# Patient Record
Sex: MALE | Race: WHITE | HISPANIC OR LATINO
[De-identification: names, ages, dates, MRNs, and addresses within clinical notes are randomized per-mention and may not be internally consistent; named-entity substitution may affect disease eponyms.]

---

## 2018-04-26 PROBLEM — Z00.00 ENCOUNTER FOR PREVENTIVE HEALTH EXAMINATION: Status: ACTIVE | Noted: 2018-04-26

## 2018-05-01 ENCOUNTER — APPOINTMENT (OUTPATIENT)
Dept: SURGERY | Facility: CLINIC | Age: 38
End: 2018-05-01

## 2020-02-05 ENCOUNTER — APPOINTMENT (OUTPATIENT)
Dept: UROLOGY | Facility: CLINIC | Age: 40
End: 2020-02-05
Payer: COMMERCIAL

## 2020-02-05 VITALS
WEIGHT: 144 LBS | RESPIRATION RATE: 15 BRPM | BODY MASS INDEX: 23.14 KG/M2 | SYSTOLIC BLOOD PRESSURE: 119 MMHG | HEIGHT: 66 IN | DIASTOLIC BLOOD PRESSURE: 71 MMHG | HEART RATE: 70 BPM

## 2020-02-05 DIAGNOSIS — N52.9 MALE ERECTILE DYSFUNCTION, UNSPECIFIED: ICD-10-CM

## 2020-02-05 DIAGNOSIS — Z78.9 OTHER SPECIFIED HEALTH STATUS: ICD-10-CM

## 2020-02-05 DIAGNOSIS — Z83.3 FAMILY HISTORY OF DIABETES MELLITUS: ICD-10-CM

## 2020-02-05 DIAGNOSIS — Z87.891 PERSONAL HISTORY OF NICOTINE DEPENDENCE: ICD-10-CM

## 2020-02-05 DIAGNOSIS — Z80.51 FAMILY HISTORY OF MALIGNANT NEOPLASM OF KIDNEY: ICD-10-CM

## 2020-02-05 PROCEDURE — 99204 OFFICE O/P NEW MOD 45 MIN: CPT

## 2020-02-11 PROBLEM — Z78.9 NO PERTINENT PAST MEDICAL HISTORY: Status: RESOLVED | Noted: 2020-02-11 | Resolved: 2020-02-11

## 2020-02-11 NOTE — ASSESSMENT
[FreeTextEntry1] : check hormone levels\par trial of sildenafil\par  side effects reviewed\par More common reviewed- redness or warmth in your face, neck, or chest; cold symptoms such as stuffy nose, sneezing, or sore throat; headache; memory problems; diarrhea, upset stomach; or muscle pain, back pain.\par Stop immediately and got to ER for changes in vision or sudden vision loss; ringing in your ears, or sudden hearing loss; chest pain or heavy feeling, pain spreading to the arm or shoulder, nausea, sweating, general ill feeling; irregular heartbeat; shortness of breath, swelling in your hands or feet; seizure (convulsions); feeling light-headed, fainting; or penis erection that is painful or lasts 4 hours or longer\par

## 2020-02-11 NOTE — HISTORY OF PRESENT ILLNESS
[FreeTextEntry1] : Cc ed\par Erectile Dysfunction\par Patient complains of erectile dysfunction. Onset of dysfunction was several years ago and was gradual in onset.  Patient states the nature of difficulty is  maintaining erection. Full erections occur never. Partial erections occursLibido is not affected. problems not partner specific

## 2020-02-11 NOTE — PHYSICAL EXAM
[General Appearance - Well Nourished] : well nourished [General Appearance - Well Developed] : well developed [Well Groomed] : well groomed [Normal Appearance] : normal appearance [Edema] : no peripheral edema [General Appearance - In No Acute Distress] : no acute distress [Respiration, Rhythm And Depth] : normal respiratory rhythm and effort [Exaggerated Use Of Accessory Muscles For Inspiration] : no accessory muscle use [Abdomen Soft] : soft [Abdomen Tenderness] : non-tender [Costovertebral Angle Tenderness] : no ~M costovertebral angle tenderness [Urethral Meatus] : meatus normal [Urinary Bladder Findings] : the bladder was normal on palpation [Scrotum] : the scrotum was normal [Testes Mass (___cm)] : there were no testicular masses [Normal Station and Gait] : the gait and station were normal for the patient's age [] : no rash [No Focal Deficits] : no focal deficits [Mood] : the mood was normal [Oriented To Time, Place, And Person] : oriented to person, place, and time [Affect] : the affect was normal [Not Anxious] : not anxious [No Palpable Adenopathy] : no palpable adenopathy

## 2020-08-11 ENCOUNTER — RX RENEWAL (OUTPATIENT)
Age: 40
End: 2020-08-11

## 2020-08-11 RX ORDER — SILDENAFIL 50 MG/1
50 TABLET ORAL
Qty: 6 | Refills: 0 | Status: ACTIVE | COMMUNITY
Start: 2020-02-05 | End: 1900-01-01

## 2021-09-02 ENCOUNTER — APPOINTMENT (OUTPATIENT)
Dept: SURGERY | Facility: CLINIC | Age: 41
End: 2021-09-02
Payer: COMMERCIAL

## 2021-09-02 VITALS
WEIGHT: 144 LBS | HEART RATE: 56 BPM | HEIGHT: 66 IN | SYSTOLIC BLOOD PRESSURE: 124 MMHG | OXYGEN SATURATION: 100 % | DIASTOLIC BLOOD PRESSURE: 77 MMHG | BODY MASS INDEX: 23.14 KG/M2

## 2021-09-02 DIAGNOSIS — K40.90 UNILATERAL INGUINAL HERNIA, W/OUT OBSTRUCTION OR GANGRENE, NOT SPECIFIED AS RECURRENT: ICD-10-CM

## 2021-09-02 PROCEDURE — 99243 OFF/OP CNSLTJ NEW/EST LOW 30: CPT

## 2021-09-02 NOTE — ASSESSMENT
[FreeTextEntry1] : 40 year M for Left  inguinal hernia repair with mesh. Risks, benefits and alternatives discussed including bleeding, infection, recurrence, nerve injury, chronic pain, testicular atrophy or swelling and hematoma. All questions answered. Agrees to proceed. \par \par \par PST\par Labs\par COVID

## 2021-09-02 NOTE — CONSULT LETTER
[Dear  ___] : Dear  [unfilled], [Consult Letter:] : I had the pleasure of evaluating your patient, [unfilled]. [Consult Closing:] : Thank you very much for allowing me to participate in the care of this patient.  If you have any questions, please do not hesitate to contact me. [Sincerely,] : Sincerely, [FreeTextEntry3] : Marck Alexandre MD\par

## 2021-09-02 NOTE — PLAN
[FreeTextEntry1] : Mr. RUSS MATHEWS [facility] Patient was told significance of findings, options, risks and benefits were explained. Informed consent for open  Left  inguinal hernia repair with mesh .and potential risks, benefits and alternatives (surgical options were discussed including non-surgical options or the option of no surgery) to the planned surgery were discussed in depth. All surgical options were discussed including non-surgical treatments. The patient wishes to proceed with surgery. We will plan for surgery on at the next available date, pending any required insurance pre-certification or pre-approval. Patient agrees to obtain any necessary pre-operative evaluations and testing prior to surgery.\par Patient advised to seek immediate medical attention with any acute change in symptoms or with the development of any new or worsening symptoms. Patient's questions and concerns addressed to patient's satisfaction, and patient verbalized an understanding of the information discussed.\par \par Will schedule for the surgery at [facility].

## 2021-09-02 NOTE — DATA REVIEWED
[FreeTextEntry1] : Date of Exam: 08-\par  \par EXAM:  ULTRASOUND GROIN FOR UNILATERAL HERNIA\par \par HISTORY:  Evaluate inguinal hernia.\par \par TECHNIQUE:  High-frequency grayscale imaging was obtained through the left groin with and without Valsalva.\par \par COMPARISON:  None.\par \par FINDINGS:\par Evaluation of the groin with and without Valsalva demonstrates a nonreducible fat filled indirect inguinal hernia. In the supine position with valsalva, the hernia measures 2.4 x 0.8 x 1.3 cm, the neck measures 0.5 cm.\par In the standing position with valsalva, the hernia measures 2.2 x 0.7 x 1.3, the neck measures 0.35.\par \par IMPRESSION:\par Nonreducible fat filled indirect inguinal hernia.

## 2021-09-02 NOTE — HISTORY OF PRESENT ILLNESS
[de-identified] : RUSS NICKERSON is a 40 year old male who present in the office for initial consultation who was referred by Dr. Bryan Crain with the chief complaint of having discomfort  and burning his Left  groin.  Patient  has had the condition for 3 months and is worse when he is heavy lifting.  Patient is stating that the hernia is getting bigger and symptomatic.

## 2021-09-02 NOTE — PHYSICAL EXAM
[Alert] : alert [Oriented to Person] : oriented to person [Oriented to Place] : oriented to place [Calm] : calm [Oriented to Time] : oriented to time [de-identified] : The patient is alert, well-groomed, well developed and cheerful.  [de-identified] : Breath sounds equal and bilateral, no wheezing no rales or rhonchi  [de-identified] : WNL [de-identified] :  good S1, S2, no m/r/g bilateral  [de-identified] : reducible Left   inguinal hernia.  No evidence of hernia on the opposite side.  No penile discharge or lesions.  No scrotal swelling or discoloration. Testes descended bilaterally, smooth, without masses. Epididymis non-tender.

## 2021-09-03 LAB
ALBUMIN SERPL ELPH-MCNC: 4.5 G/DL
ALP BLD-CCNC: 62 U/L
ALT SERPL-CCNC: 34 U/L
ANION GAP SERPL CALC-SCNC: 9 MMOL/L
AST SERPL-CCNC: 25 U/L
BASOPHILS # BLD AUTO: 0.05 K/UL
BASOPHILS NFR BLD AUTO: 0.8 %
BILIRUB SERPL-MCNC: 0.4 MG/DL
BUN SERPL-MCNC: 12 MG/DL
CALCIUM SERPL-MCNC: 9.7 MG/DL
CHLORIDE SERPL-SCNC: 105 MMOL/L
CO2 SERPL-SCNC: 27 MMOL/L
CREAT SERPL-MCNC: 0.88 MG/DL
EOSINOPHIL # BLD AUTO: 0.14 K/UL
EOSINOPHIL NFR BLD AUTO: 2.4 %
GLUCOSE SERPL-MCNC: 76 MG/DL
HCT VFR BLD CALC: 44.4 %
HGB BLD-MCNC: 14.8 G/DL
IMM GRANULOCYTES NFR BLD AUTO: 0.2 %
LYMPHOCYTES # BLD AUTO: 2.29 K/UL
LYMPHOCYTES NFR BLD AUTO: 38.7 %
MAN DIFF?: NORMAL
MCHC RBC-ENTMCNC: 31.8 PG
MCHC RBC-ENTMCNC: 33.3 GM/DL
MCV RBC AUTO: 95.3 FL
MONOCYTES # BLD AUTO: 0.65 K/UL
MONOCYTES NFR BLD AUTO: 11 %
NEUTROPHILS # BLD AUTO: 2.78 K/UL
NEUTROPHILS NFR BLD AUTO: 46.9 %
PLATELET # BLD AUTO: 210 K/UL
POTASSIUM SERPL-SCNC: 4.1 MMOL/L
PROT SERPL-MCNC: 6.9 G/DL
RBC # BLD: 4.66 M/UL
RBC # FLD: 11.8 %
SODIUM SERPL-SCNC: 141 MMOL/L
WBC # FLD AUTO: 5.92 K/UL

## 2021-09-09 ENCOUNTER — OUTPATIENT (OUTPATIENT)
Dept: OUTPATIENT SERVICES | Facility: HOSPITAL | Age: 41
LOS: 1 days | End: 2021-09-09
Payer: COMMERCIAL

## 2021-09-09 VITALS
SYSTOLIC BLOOD PRESSURE: 116 MMHG | DIASTOLIC BLOOD PRESSURE: 71 MMHG | HEART RATE: 61 BPM | TEMPERATURE: 99 F | WEIGHT: 143.96 LBS | OXYGEN SATURATION: 100 % | RESPIRATION RATE: 17 BRPM | HEIGHT: 66 IN

## 2021-09-09 DIAGNOSIS — Z78.9 OTHER SPECIFIED HEALTH STATUS: Chronic | ICD-10-CM

## 2021-09-09 DIAGNOSIS — B20 HUMAN IMMUNODEFICIENCY VIRUS [HIV] DISEASE: ICD-10-CM

## 2021-09-09 DIAGNOSIS — Z01.818 ENCOUNTER FOR OTHER PREPROCEDURAL EXAMINATION: ICD-10-CM

## 2021-09-09 DIAGNOSIS — K40.90 UNILATERAL INGUINAL HERNIA, WITHOUT OBSTRUCTION OR GANGRENE, NOT SPECIFIED AS RECURRENT: ICD-10-CM

## 2021-09-09 DIAGNOSIS — Z29.9 ENCOUNTER FOR PROPHYLACTIC MEASURES, UNSPECIFIED: ICD-10-CM

## 2021-09-09 PROCEDURE — G0463: CPT

## 2021-09-09 NOTE — H&P PST ADULT - ASSESSMENT
40 yr old pleasant male with history of HIV presents with unilateral inguinal hernia without obstruction or gangrene not specified as recurrent. Pt schedule f open left inguinal hernia repair with mesh on 9/15/2021.

## 2021-09-09 NOTE — H&P PST ADULT - NSICDXPASTMEDICALHX_GEN_ALL_CORE_FT
PAST MEDICAL HISTORY:  HIV disease     Unilateral inguinal hernia, without obstruction or gangrene, not specified as recurrent

## 2021-09-09 NOTE — H&P PST ADULT - HISTORY OF PRESENT ILLNESS
40 yr old healthy male with history of HIV presents with unilateral inguinal hernia without obstruction or gangrene not specified as recurrent. Pt stated in July 2021 felt a lump in his groin after carrying luggage on a vacation. Pt had a ultrasound in August which confirmed a hernia. Pt is schedule for open left inguinal hernia with mesh on 9/15/2021. Pt instructed to use chlorhexidine wash the morning of surgery. Pt verbalized understanding and provided paper instruction.

## 2021-09-12 ENCOUNTER — APPOINTMENT (OUTPATIENT)
Dept: DISASTER EMERGENCY | Facility: CLINIC | Age: 41
End: 2021-09-12

## 2021-09-12 DIAGNOSIS — Z01.818 ENCOUNTER FOR OTHER PREPROCEDURAL EXAMINATION: ICD-10-CM

## 2021-09-13 LAB — SARS-COV-2 N GENE NPH QL NAA+PROBE: NOT DETECTED

## 2021-09-14 ENCOUNTER — TRANSCRIPTION ENCOUNTER (OUTPATIENT)
Age: 41
End: 2021-09-14

## 2021-09-15 ENCOUNTER — OUTPATIENT (OUTPATIENT)
Dept: OUTPATIENT SERVICES | Facility: HOSPITAL | Age: 41
LOS: 1 days | End: 2021-09-15
Payer: COMMERCIAL

## 2021-09-15 ENCOUNTER — APPOINTMENT (OUTPATIENT)
Dept: SURGERY | Facility: HOSPITAL | Age: 41
End: 2021-09-15

## 2021-09-15 VITALS
WEIGHT: 143.96 LBS | TEMPERATURE: 98 F | HEIGHT: 66 IN | HEART RATE: 63 BPM | SYSTOLIC BLOOD PRESSURE: 113 MMHG | RESPIRATION RATE: 16 BRPM | DIASTOLIC BLOOD PRESSURE: 71 MMHG | OXYGEN SATURATION: 99 %

## 2021-09-15 VITALS
HEART RATE: 60 BPM | TEMPERATURE: 98 F | DIASTOLIC BLOOD PRESSURE: 73 MMHG | SYSTOLIC BLOOD PRESSURE: 110 MMHG | OXYGEN SATURATION: 100 % | RESPIRATION RATE: 12 BRPM

## 2021-09-15 DIAGNOSIS — Z01.818 ENCOUNTER FOR OTHER PREPROCEDURAL EXAMINATION: ICD-10-CM

## 2021-09-15 DIAGNOSIS — Z78.9 OTHER SPECIFIED HEALTH STATUS: Chronic | ICD-10-CM

## 2021-09-15 DIAGNOSIS — K40.90 UNILATERAL INGUINAL HERNIA, WITHOUT OBSTRUCTION OR GANGRENE, NOT SPECIFIED AS RECURRENT: ICD-10-CM

## 2021-09-15 PROCEDURE — C1781: CPT

## 2021-09-15 PROCEDURE — 49505 PRP I/HERN INIT REDUC >5 YR: CPT

## 2021-09-15 PROCEDURE — 49505 PRP I/HERN INIT REDUC >5 YR: CPT | Mod: LT

## 2021-09-15 PROCEDURE — 49505 PRP I/HERN INIT REDUC >5 YR: CPT | Mod: AS

## 2021-09-15 RX ORDER — ONDANSETRON 8 MG/1
4 TABLET, FILM COATED ORAL ONCE
Refills: 0 | Status: DISCONTINUED | OUTPATIENT
Start: 2021-09-15 | End: 2021-09-15

## 2021-09-15 RX ORDER — EMTRICITABINE AND TENOFOVIR DISOPROXIL FUMARATE 200; 300 MG/1; MG/1
1 TABLET, FILM COATED ORAL
Qty: 0 | Refills: 0 | DISCHARGE

## 2021-09-15 RX ORDER — ACETAMINOPHEN 500 MG
1000 TABLET ORAL ONCE
Refills: 0 | Status: DISCONTINUED | OUTPATIENT
Start: 2021-09-15 | End: 2021-09-15

## 2021-09-15 RX ORDER — FENTANYL CITRATE 50 UG/ML
25 INJECTION INTRAVENOUS
Refills: 0 | Status: DISCONTINUED | OUTPATIENT
Start: 2021-09-15 | End: 2021-09-15

## 2021-09-15 RX ORDER — FENTANYL CITRATE 50 UG/ML
50 INJECTION INTRAVENOUS
Refills: 0 | Status: DISCONTINUED | OUTPATIENT
Start: 2021-09-15 | End: 2021-09-15

## 2021-09-15 RX ORDER — SODIUM CHLORIDE 9 MG/ML
3 INJECTION INTRAMUSCULAR; INTRAVENOUS; SUBCUTANEOUS EVERY 8 HOURS
Refills: 0 | Status: DISCONTINUED | OUTPATIENT
Start: 2021-09-15 | End: 2021-09-15

## 2021-09-15 RX ORDER — OXYCODONE AND ACETAMINOPHEN 5; 325 MG/1; MG/1
1 TABLET ORAL ONCE
Refills: 0 | Status: DISCONTINUED | OUTPATIENT
Start: 2021-09-15 | End: 2021-09-22

## 2021-09-15 NOTE — ASU DISCHARGE PLAN (ADULT/PEDIATRIC) - CARE PROVIDER_API CALL
Marck Alexandre (MD)  Surgery  [address] [place], [place] [id number]  Phone: [phone]  Fax: [phone]  Follow Up Time:

## 2021-09-15 NOTE — BRIEF OPERATIVE NOTE - NSICDXBRIEFPROCEDURE_GEN_ALL_CORE_FT
PROCEDURES:  Repair, hernia, inguinal, open, using mesh, adult 15-Sep-2021 08:58:01 Left Debbie Redmond

## 2021-09-16 PROBLEM — K40.90 UNILATERAL INGUINAL HERNIA, WITHOUT OBSTRUCTION OR GANGRENE, NOT SPECIFIED AS RECURRENT: Chronic | Status: ACTIVE | Noted: 2021-09-09

## 2021-09-16 PROBLEM — B20 HUMAN IMMUNODEFICIENCY VIRUS [HIV] DISEASE: Chronic | Status: ACTIVE | Noted: 2021-09-09

## 2021-09-23 ENCOUNTER — APPOINTMENT (OUTPATIENT)
Dept: SURGERY | Facility: CLINIC | Age: 41
End: 2021-09-23
Payer: COMMERCIAL

## 2021-09-23 VITALS — TEMPERATURE: 97.2 F

## 2021-09-23 PROCEDURE — 99024 POSTOP FOLLOW-UP VISIT: CPT

## 2021-09-23 NOTE — REASON FOR VISIT
[Post Op: _________] : a [unfilled] post op visit [FreeTextEntry1] : Repair of left inguinal hernia with mesh on 09/15/2021

## 2021-09-23 NOTE — HISTORY OF PRESENT ILLNESS
[de-identified] : RUSS HORTA is a 40 year old male who presents in the office for postop visit. He underwent a Repair of left inguinal hernia with mesh on 09/15/2021

## 2021-09-23 NOTE — ASSESSMENT
[FreeTextEntry1] : RUSS HORTA is a 40 year old male who underwent a  Repair of left inguinal hernia with mesh on 09/15/2021

## 2021-09-23 NOTE — CONSULT LETTER
[Dear  ___] : Dear  [unfilled], [Courtesy Letter:] : I had the pleasure of seeing your patient, [unfilled], in my office today. [Consult Closing:] : Thank you very much for allowing me to participate in the care of this patient.  If you have any questions, please do not hesitate to contact me. [Sincerely,] : Sincerely, [FreeTextEntry3] : Dr Alexandre

## 2022-01-06 NOTE — ASU PATIENT PROFILE, ADULT - DOMESTIC TRAVEL HIGH RISK QUESTION
Long discussion regarding all options and risks; all lab values and imaging studies reviewed; discussed with medicine No

## 2024-12-05 ENCOUNTER — APPOINTMENT (OUTPATIENT)
Dept: SURGERY | Facility: CLINIC | Age: 44
End: 2024-12-05
Payer: COMMERCIAL

## 2024-12-05 VITALS
HEART RATE: 60 BPM | DIASTOLIC BLOOD PRESSURE: 85 MMHG | BODY MASS INDEX: 24.75 KG/M2 | HEIGHT: 66 IN | SYSTOLIC BLOOD PRESSURE: 127 MMHG | WEIGHT: 154 LBS

## 2024-12-05 DIAGNOSIS — K60.30 ANAL FISTULA, UNSPECIFIED: ICD-10-CM

## 2024-12-05 PROCEDURE — 99203 OFFICE O/P NEW LOW 30 MIN: CPT

## 2024-12-05 RX ORDER — EMTRICITABINE AND TENOFOVIR ALAFENAMIDE 120; 15 MG/1; MG/1
TABLET ORAL
Refills: 0 | Status: ACTIVE | COMMUNITY

## 2024-12-25 PROBLEM — F10.90 ALCOHOL USE: Status: INACTIVE | Noted: 2020-02-05

## 2025-02-28 ENCOUNTER — INPATIENT (INPATIENT)
Facility: HOSPITAL | Age: 45
LOS: 6 days | Discharge: HOME | DRG: 864 | End: 2025-03-07
Attending: HOSPITALIST | Admitting: HOSPITALIST
Payer: COMMERCIAL

## 2025-02-28 VITALS
DIASTOLIC BLOOD PRESSURE: 77 MMHG | TEMPERATURE: 102 F | HEIGHT: 65 IN | HEART RATE: 101 BPM | WEIGHT: 160.06 LBS | OXYGEN SATURATION: 95 % | RESPIRATION RATE: 22 BRPM | SYSTOLIC BLOOD PRESSURE: 128 MMHG

## 2025-02-28 DIAGNOSIS — Z78.9 OTHER SPECIFIED HEALTH STATUS: Chronic | ICD-10-CM

## 2025-02-28 LAB
ALBUMIN SERPL ELPH-MCNC: 3.2 G/DL — LOW (ref 3.5–5)
ALP SERPL-CCNC: 139 U/L — HIGH (ref 40–120)
ALT FLD-CCNC: 379 U/L DA — HIGH (ref 10–60)
ANION GAP SERPL CALC-SCNC: 4 MMOL/L — LOW (ref 5–17)
APPEARANCE UR: CLEAR — SIGNIFICANT CHANGE UP
APTT BLD: 30.2 SEC — SIGNIFICANT CHANGE UP (ref 24.5–35.6)
AST SERPL-CCNC: 249 U/L — HIGH (ref 10–40)
BACTERIA # UR AUTO: ABNORMAL /HPF
BASOPHILS # BLD AUTO: 0 K/UL — SIGNIFICANT CHANGE UP (ref 0–0.2)
BASOPHILS NFR BLD AUTO: 0 % — SIGNIFICANT CHANGE UP (ref 0–2)
BILIRUB SERPL-MCNC: 0.7 MG/DL — SIGNIFICANT CHANGE UP (ref 0.2–1.2)
BILIRUB UR-MCNC: ABNORMAL
BUN SERPL-MCNC: 12 MG/DL — SIGNIFICANT CHANGE UP (ref 7–18)
CALCIUM SERPL-MCNC: 8.5 MG/DL — SIGNIFICANT CHANGE UP (ref 8.4–10.5)
CHLORIDE SERPL-SCNC: 106 MMOL/L — SIGNIFICANT CHANGE UP (ref 96–108)
CO2 SERPL-SCNC: 25 MMOL/L — SIGNIFICANT CHANGE UP (ref 22–31)
COLOR SPEC: SIGNIFICANT CHANGE UP
COMMENT - URINE: SIGNIFICANT CHANGE UP
CREAT SERPL-MCNC: 0.96 MG/DL — SIGNIFICANT CHANGE UP (ref 0.5–1.3)
DIFF PNL FLD: NEGATIVE — SIGNIFICANT CHANGE UP
EGFR: 99 ML/MIN/1.73M2 — SIGNIFICANT CHANGE UP
EGFR: 99 ML/MIN/1.73M2 — SIGNIFICANT CHANGE UP
EOSINOPHIL # BLD AUTO: 0.11 K/UL — SIGNIFICANT CHANGE UP (ref 0–0.5)
EOSINOPHIL NFR BLD AUTO: 1 % — SIGNIFICANT CHANGE UP (ref 0–6)
EPI CELLS # UR: PRESENT
FLUAV AG NPH QL: SIGNIFICANT CHANGE UP
FLUBV AG NPH QL: SIGNIFICANT CHANGE UP
GLUCOSE SERPL-MCNC: 110 MG/DL — HIGH (ref 70–99)
GLUCOSE UR QL: NEGATIVE MG/DL — SIGNIFICANT CHANGE UP
HCT VFR BLD CALC: 40.1 % — SIGNIFICANT CHANGE UP (ref 39–50)
HGB BLD-MCNC: 14 G/DL — SIGNIFICANT CHANGE UP (ref 13–17)
HYPOSEGMENTATION: PRESENT — SIGNIFICANT CHANGE UP
INR BLD: 1.22 RATIO — HIGH (ref 0.85–1.16)
KETONES UR-MCNC: ABNORMAL MG/DL
LACTATE SERPL-SCNC: 1 MMOL/L — SIGNIFICANT CHANGE UP (ref 0.7–2)
LEUKOCYTE ESTERASE UR-ACNC: ABNORMAL
LYMPHOCYTES # BLD AUTO: 5.79 K/UL — HIGH (ref 1–3.3)
LYMPHOCYTES # BLD AUTO: 54 % — HIGH (ref 13–44)
MAGNESIUM SERPL-MCNC: 2 MG/DL — SIGNIFICANT CHANGE UP (ref 1.6–2.6)
MANUAL SMEAR VERIFICATION: SIGNIFICANT CHANGE UP
MCHC RBC-ENTMCNC: 31.2 PG — SIGNIFICANT CHANGE UP (ref 27–34)
MCHC RBC-ENTMCNC: 34.9 G/DL — SIGNIFICANT CHANGE UP (ref 32–36)
MCV RBC AUTO: 89.3 FL — SIGNIFICANT CHANGE UP (ref 80–100)
MONOCYTES # BLD AUTO: 0.75 K/UL — SIGNIFICANT CHANGE UP (ref 0–0.9)
MONOCYTES NFR BLD AUTO: 7 % — SIGNIFICANT CHANGE UP (ref 2–14)
NEUTROPHILS # BLD AUTO: 3.43 K/UL — SIGNIFICANT CHANGE UP (ref 1.8–7.4)
NEUTROPHILS NFR BLD AUTO: 32 % — LOW (ref 43–77)
NITRITE UR-MCNC: NEGATIVE — SIGNIFICANT CHANGE UP
NRBC # BLD: 0 /100 WBCS — SIGNIFICANT CHANGE UP (ref 0–0)
NRBC BLD-RTO: 0 /100 WBCS — SIGNIFICANT CHANGE UP (ref 0–0)
OVALOCYTES BLD QL SMEAR: SLIGHT — SIGNIFICANT CHANGE UP
PH UR: 5 — SIGNIFICANT CHANGE UP (ref 5–8)
PHOSPHATE SERPL-MCNC: 1.8 MG/DL — LOW (ref 2.5–4.5)
PLAT MORPH BLD: NORMAL — SIGNIFICANT CHANGE UP
PLATELET # BLD AUTO: 162 K/UL — SIGNIFICANT CHANGE UP (ref 150–400)
PLATELET COUNT - ESTIMATE: NORMAL — SIGNIFICANT CHANGE UP
POIKILOCYTOSIS BLD QL AUTO: SLIGHT — SIGNIFICANT CHANGE UP
POTASSIUM SERPL-MCNC: 3.5 MMOL/L — SIGNIFICANT CHANGE UP (ref 3.5–5.3)
POTASSIUM SERPL-SCNC: 3.5 MMOL/L — SIGNIFICANT CHANGE UP (ref 3.5–5.3)
PROT SERPL-MCNC: 6.8 G/DL — SIGNIFICANT CHANGE UP (ref 6–8.3)
PROT UR-MCNC: 30 MG/DL
PROTHROM AB SERPL-ACNC: 14.3 SEC — HIGH (ref 9.9–13.4)
RBC # BLD: 4.49 M/UL — SIGNIFICANT CHANGE UP (ref 4.2–5.8)
RBC # FLD: 11.3 % — SIGNIFICANT CHANGE UP (ref 10.3–14.5)
RBC BLD AUTO: ABNORMAL
RBC CASTS # UR COMP ASSIST: 0 /HPF — SIGNIFICANT CHANGE UP (ref 0–4)
RSV RNA NPH QL NAA+NON-PROBE: SIGNIFICANT CHANGE UP
SARS-COV-2 RNA SPEC QL NAA+PROBE: SIGNIFICANT CHANGE UP
SODIUM SERPL-SCNC: 135 MMOL/L — SIGNIFICANT CHANGE UP (ref 135–145)
SP GR SPEC: 1.03 — HIGH (ref 1–1.03)
UROBILINOGEN FLD QL: 1 MG/DL — SIGNIFICANT CHANGE UP (ref 0.2–1)
VARIANT LYMPHS # BLD: 6 % — SIGNIFICANT CHANGE UP (ref 0–6)
VARIANT LYMPHS NFR BLD MANUAL: 6 % — SIGNIFICANT CHANGE UP (ref 0–6)
WBC # BLD: 10.72 K/UL — HIGH (ref 3.8–10.5)
WBC # FLD AUTO: 10.72 K/UL — HIGH (ref 3.8–10.5)
WBC UR QL: 4 /HPF — SIGNIFICANT CHANGE UP (ref 0–5)

## 2025-02-28 PROCEDURE — 99285 EMERGENCY DEPT VISIT HI MDM: CPT

## 2025-02-28 PROCEDURE — 71046 X-RAY EXAM CHEST 2 VIEWS: CPT | Mod: 26

## 2025-02-28 RX ORDER — SOD PHOS DI, MONO/K PHOS MONO 250 MG
1 TABLET ORAL ONCE
Refills: 0 | Status: COMPLETED | OUTPATIENT
Start: 2025-02-28 | End: 2025-02-28

## 2025-02-28 RX ORDER — ACETAMINOPHEN 500 MG/5ML
650 LIQUID (ML) ORAL ONCE
Refills: 0 | Status: COMPLETED | OUTPATIENT
Start: 2025-02-28 | End: 2025-02-28

## 2025-02-28 RX ORDER — CEFTRIAXONE 500 MG/1
1000 INJECTION, POWDER, FOR SOLUTION INTRAMUSCULAR; INTRAVENOUS ONCE
Refills: 0 | Status: COMPLETED | OUTPATIENT
Start: 2025-02-28 | End: 2025-02-28

## 2025-02-28 RX ADMIN — Medication 650 MILLIGRAM(S): at 21:25

## 2025-02-28 RX ADMIN — Medication 2300 MILLILITER(S): at 21:24

## 2025-02-28 RX ADMIN — CEFTRIAXONE 100 MILLIGRAM(S): 500 INJECTION, POWDER, FOR SOLUTION INTRAMUSCULAR; INTRAVENOUS at 21:25

## 2025-02-28 RX ADMIN — Medication 1 PACKET(S): at 22:57

## 2025-02-28 RX ADMIN — Medication 2300 MILLILITER(S): at 22:24

## 2025-02-28 RX ADMIN — CEFTRIAXONE 1000 MILLIGRAM(S): 500 INJECTION, POWDER, FOR SOLUTION INTRAMUSCULAR; INTRAVENOUS at 21:55

## 2025-02-28 RX ADMIN — Medication 650 MILLIGRAM(S): at 22:25

## 2025-02-28 NOTE — ED PROVIDER NOTE - CLINICAL SUMMARY MEDICAL DECISION MAKING FREE TEXT BOX
44 male with hx of no known medical problems.   Patient presenting to the ED reporting 5 days of fever, cough, & dyspnea worse tonight.  Patient reports having chills, myalgias, & malaise 2 weeks ago that resolved after a few days.  Was given Tamiflu by PMD on 2/25 but had not had a flu test.  Took acetaminophen 1 hour prior to ED arrival.  No recent travel, hospitalization, or ABX.  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    Vitals with fever, tachypnea, & tachycardia  Nontoxic appearing, n/v intact.  Airway intact, no respiratory distress, no hypoxia.  No abdominal or CVA tenderness.  Code sepsis activated in triage.    Plan to obtain:  -Labs, EKG, CXR, IV fluids, antipyretics as needed, ABX, observe/reassess    ED Course:  Lab values demonstrate no acute/emergent pathology.  My independent interpretation of the EKG: Sinus @ [], normal axis, normal intervals, normal ST/T  My independent interpretation of XR: [No consolidation/effusion/pntx.]    [***]    [Patient advised regarding need for close outpatient follow up.  Patient stable for further care in outpatient setting. No indication for inpatient admission at this time. Patient advised regarding symptomatic & supportive care and symptoms to prompt ED return. Strict return precautions provided.]    [Patient requires inpatient admission for further care & stabilization. Care signed out to inpatient team.] 44 male with hx of no known medical problems.   Patient presenting to the ED reporting 5 days of fever, cough, & dyspnea worse tonight.  Patient reports having chills, myalgias, & malaise 2 weeks ago that resolved after a few days.  Was given Tamiflu by PMD on 2/25 but had not had a flu test.  Took acetaminophen 1 hour prior to ED arrival.  No recent travel, hospitalization, or ABX.  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    Vitals with fever, tachypnea, & tachycardia  Nontoxic appearing, n/v intact.  Airway intact, no respiratory distress, no hypoxia.  No abdominal or CVA tenderness.  Code sepsis activated in triage.    Plan to obtain:  -Labs, EKG, CXR, IV fluids, antipyretics as needed, ABX, observe/reassess    ED Course:  Lab values w normal WBC, lactate, electrolytes, renal function, & UA. LFT abnl. CT added  My independent interpretation of the EKG: Sinus @ 81, normal axis, normal intervals, incomplete RBBB, normal ST/T  My independent interpretation of XR: No consolidation/effusion/pntx.    [***]    [Patient advised regarding need for close outpatient follow up.  Patient stable for further care in outpatient setting. No indication for inpatient admission at this time. Patient advised regarding symptomatic & supportive care and symptoms to prompt ED return. Strict return precautions provided.]    [Patient requires inpatient admission for further care & stabilization. Care signed out to inpatient team.] 44 male with hx of no known medical problems.   Patient presenting to the ED reporting 5 days of fever, cough, & dyspnea worse tonight.  Patient reports having chills, myalgias, & malaise 2 weeks ago that resolved after a few days.  Was given Tamiflu by PMD on 2/25 but had not had a flu test.    No recent travel, hospitalization, or ABX.  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    Vitals with fever, tachypnea, & tachycardia  Nontoxic appearing, n/v intact.  Airway intact, no respiratory distress, no hypoxia.  No abdominal or CVA tenderness.  Code sepsis activated in triage.    Plan to obtain:  -Labs, EKG, CXR, IV fluids, antipyretics as needed, ABX, observe/reassess    ED Course:  Lab values w normal WBC, lactate, electrolytes, renal function, & UA. LFT abnl.   My independent interpretation of the EKG: Sinus @ 81, normal axis, normal intervals, incomplete RBBB, normal ST/T  My independent interpretation of XR: No consolidation/effusion/pntx.  CT added to assess for possible cholecystitis or occult PNA.  IV fluids, antipyretics, & Abx given.    CT results pending at time of signout.

## 2025-02-28 NOTE — ED PROVIDER NOTE - PHYSICAL EXAMINATION
Gen:  Awake, alert, NAD, WDWN, NCAT, non-toxic appearing.   Eyes:  PERRL, EOMI, no icterus, normal lids/lashes, normal conjunctivae.  ENT:  External inspection normal, pink/moist membranes. Pharynx normal, no pharyngeal erythema/exudate, no drooling, no lip/tongue/palate/posterior pharynx edema, midline uvula, no oropharyngeal ulcerations/lesions.  CV:  S1S2, regular rate and rhythm, no murmur/gallops/rubs, no JVD, 2+ pulses b/l, no edema/cords/homans, good capillary refill, warm/well-perfused.  Resp:  Normal respiratory rate/effort, no respiratory distress, normal voice, speaking full sentences, lungs clear to auscultation b/l, no wheezing/rales/rhonchi, no retractions, no stridor.  Abd:  Soft abdomen, no tender/distended/guarding/rebound, no pulsatile mass, no CVA tender.   Musculoskeletal:  N/V intact, FROM all 4 extremities, normal motor tone, stable gait.   Neck:  FROM neck, supple, trachea midline, no meningismus.   Skin:  Color normal for race, warm and dry, no rash.  Neuro:  Oriented x3, CN 2-12 intact (grossly), normal motor (grossly), normal sensory (grossly), normal gait. GCS 15  Psych:  Attention normal. Affect normal. Behavior normal. Judgment normal.

## 2025-02-28 NOTE — ED ADULT NURSE NOTE - BREATHING, MLM
Addended by: Gayle Greer on: 9/26/2019 01:21 PM     Modules accepted: Orders Spontaneous, unlabored and symmetrical

## 2025-02-28 NOTE — ED PROVIDER NOTE - NS ED ROS FT
Constitutional: (+) fever (+) chills (+) malaise  HENT: (-) congestion (-) rhinorrhea (-) sore throat  Eyes: (-) pain (-) redness  Respiratory: (+) dry cough (+) shortness of breath (-) wheezing (-) stridor  Cardiovascular: (-) chest pain (-) palpitations (-) leg swelling  Gastrointestinal: (-) abdominal pain (-) blood in stool (no melena/hematochezia) (-) diarrhea (-) vomiting  Genitourinary: (-) dysuria (-) hematuria  Musculoskeletal: (-) gait problem (-) joint swelling (-) myalgias  Skin: (-) color change (-) rash  Neurological: (-) weakness (-) numbness (-) headaches  Psychiatric/Behavioral: (-) confusion

## 2025-02-28 NOTE — ED PROVIDER NOTE - INDEPENDENTLY INTERPRETED
Yes
Abd: gravid soft nontender  CV: rrr  VE:1/60/-3  Sono: cephalic presentation    A+P: pt is a P0 at 41+weeks presenting for IOL; GBS+, ITP  -admit to L&D  -amp for gbs ppx  -routine labs  -efm/toco  -PO cytotec  -IV hydration  -bicitra  -anesthesia consult    d/[facility]
Yes

## 2025-02-28 NOTE — ED ADULT NURSE NOTE - NSFALLUNIVINTERV_ED_ALL_ED
Bed/Stretcher in lowest position, wheels locked, appropriate side rails in place/Call bell, personal items and telephone in reach/Instruct patient to call for assistance before getting out of bed/chair/stretcher/Non-slip footwear applied when patient is off stretcher/Seth to call system/Physically safe environment - no spills, clutter or unnecessary equipment/Purposeful proactive rounding/Room/bathroom lighting operational, light cord in reach

## 2025-02-28 NOTE — ED PROVIDER NOTE - OBJECTIVE STATEMENT
44 male with hx of no known medical problems.   Patient presenting to the ED reporting 5 days of fever, cough, & dyspnea worse tonight.  Patient reports having chills, myalgias, & malaise 2 weeks ago that resolved after a few days.  Was given Tamiflu by PMD on 2/25 but had not had a flu test.  Took acetaminophen 1 hour prior to ED arrival.  No recent travel, hospitalization, or ABX.  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 44 male with hx of no known medical problems.   Patient presenting to the ED reporting 5 days of fever, cough, & dyspnea worse tonight.  Patient reports having chills, myalgias, & malaise 2 weeks ago that resolved after a few days.  Was given Tamiflu by PMD on 2/25 but had not had a flu test.    No recent travel, hospitalization, or ABX.  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

## 2025-02-28 NOTE — ED ADULT TRIAGE NOTE - SOURCE OF INFORMATION
-- DO NOT REPLY / DO NOT REPLY ALL --  -- Message is from Engagement Center Operations (ECO) --    General Patient Message: PATIENT SON NAS WOULD LIKE TO DISCUSS LORIN'S WATER PILL MEDICATION TO BE DISCONTINUED. PLEASE CALL MAGGIE LOVELL.     Caller Information       Type Contact Phone/Fax    01/05/2023 11:48 AM CST Phone (Incoming) NAS QUAN (Emergency Contact) [phone]        Alternative phone number: NO    Can a detailed message be left? Yes    Message Turnaround:     Is it Working Hours? Yes - Working Hours     [place]:    Please give this turnaround time to the caller:   \"This message will be sent to [state Provider's name]. The clinical team will fulfill your request as soon as they review your message.\"                 Patient

## 2025-03-01 DIAGNOSIS — R74.01 ELEVATION OF LEVELS OF LIVER TRANSAMINASE LEVELS: ICD-10-CM

## 2025-03-01 DIAGNOSIS — Z91.89 OTHER SPECIFIED PERSONAL RISK FACTORS, NOT ELSEWHERE CLASSIFIED: ICD-10-CM

## 2025-03-01 DIAGNOSIS — A41.9 SEPSIS, UNSPECIFIED ORGANISM: ICD-10-CM

## 2025-03-01 DIAGNOSIS — R50.9 FEVER, UNSPECIFIED: ICD-10-CM

## 2025-03-01 DIAGNOSIS — Z29.9 ENCOUNTER FOR PROPHYLACTIC MEASURES, UNSPECIFIED: ICD-10-CM

## 2025-03-01 LAB
ALBUMIN SERPL ELPH-MCNC: 2.9 G/DL — LOW (ref 3.5–5)
ALP SERPL-CCNC: 120 U/L — SIGNIFICANT CHANGE UP (ref 40–120)
ALT FLD-CCNC: 364 U/L DA — HIGH (ref 10–60)
AMPHET UR-MCNC: NEGATIVE — SIGNIFICANT CHANGE UP
ANION GAP SERPL CALC-SCNC: 6 MMOL/L — SIGNIFICANT CHANGE UP (ref 5–17)
AST SERPL-CCNC: 222 U/L — HIGH (ref 10–40)
BARBITURATES UR SCN-MCNC: NEGATIVE — SIGNIFICANT CHANGE UP
BENZODIAZ UR-MCNC: NEGATIVE — SIGNIFICANT CHANGE UP
BILIRUB SERPL-MCNC: 0.5 MG/DL — SIGNIFICANT CHANGE UP (ref 0.2–1.2)
BUN SERPL-MCNC: 8 MG/DL — SIGNIFICANT CHANGE UP (ref 7–18)
CALCIUM SERPL-MCNC: 8 MG/DL — LOW (ref 8.4–10.5)
CHLORIDE SERPL-SCNC: 110 MMOL/L — HIGH (ref 96–108)
CK SERPL-CCNC: 63 U/L — SIGNIFICANT CHANGE UP (ref 35–232)
CO2 SERPL-SCNC: 26 MMOL/L — SIGNIFICANT CHANGE UP (ref 22–31)
COCAINE METAB.OTHER UR-MCNC: NEGATIVE — SIGNIFICANT CHANGE UP
CREAT SERPL-MCNC: 0.8 MG/DL — SIGNIFICANT CHANGE UP (ref 0.5–1.3)
EGFR: 111 ML/MIN/1.73M2 — SIGNIFICANT CHANGE UP
EGFR: 111 ML/MIN/1.73M2 — SIGNIFICANT CHANGE UP
GLUCOSE SERPL-MCNC: 92 MG/DL — SIGNIFICANT CHANGE UP (ref 70–99)
HCT VFR BLD CALC: 38.3 % — LOW (ref 39–50)
HGB BLD-MCNC: 12.9 G/DL — LOW (ref 13–17)
HIV 1 & 2 AB SERPL IA.RAPID: SIGNIFICANT CHANGE UP
HIV-1 VIRAL LOAD RESULT: SIGNIFICANT CHANGE UP
HIV1 RNA # SERPL NAA+PROBE: SIGNIFICANT CHANGE UP COPIES/ML
HIV1 RNA SER-IMP: SIGNIFICANT CHANGE UP
HIV1 RNA SERPL NAA+PROBE-ACNC: SIGNIFICANT CHANGE UP
HIV1 RNA SERPL NAA+PROBE-LOG#: SIGNIFICANT CHANGE UP LG COP/ML
MAGNESIUM SERPL-MCNC: 2 MG/DL — SIGNIFICANT CHANGE UP (ref 1.6–2.6)
MCHC RBC-ENTMCNC: 30.6 PG — SIGNIFICANT CHANGE UP (ref 27–34)
MCHC RBC-ENTMCNC: 33.7 G/DL — SIGNIFICANT CHANGE UP (ref 32–36)
MCV RBC AUTO: 91 FL — SIGNIFICANT CHANGE UP (ref 80–100)
METHADONE UR-MCNC: NEGATIVE — SIGNIFICANT CHANGE UP
NRBC BLD AUTO-RTO: 0 /100 WBCS — SIGNIFICANT CHANGE UP (ref 0–0)
OPIATES UR-MCNC: NEGATIVE — SIGNIFICANT CHANGE UP
PCP SPEC-MCNC: SIGNIFICANT CHANGE UP
PCP UR-MCNC: NEGATIVE — SIGNIFICANT CHANGE UP
PHOSPHATE SERPL-MCNC: 2 MG/DL — LOW (ref 2.5–4.5)
PLATELET # BLD AUTO: 152 K/UL — SIGNIFICANT CHANGE UP (ref 150–400)
POTASSIUM SERPL-MCNC: 4.3 MMOL/L — SIGNIFICANT CHANGE UP (ref 3.5–5.3)
POTASSIUM SERPL-SCNC: 4.3 MMOL/L — SIGNIFICANT CHANGE UP (ref 3.5–5.3)
PROT SERPL-MCNC: 6.1 G/DL — SIGNIFICANT CHANGE UP (ref 6–8.3)
RAPID RVP RESULT: SIGNIFICANT CHANGE UP
RBC # BLD: 4.21 M/UL — SIGNIFICANT CHANGE UP (ref 4.2–5.8)
RBC # FLD: 11.3 % — SIGNIFICANT CHANGE UP (ref 10.3–14.5)
SARS-COV-2 RNA SPEC QL NAA+PROBE: SIGNIFICANT CHANGE UP
SODIUM SERPL-SCNC: 142 MMOL/L — SIGNIFICANT CHANGE UP (ref 135–145)
THC UR QL: NEGATIVE — SIGNIFICANT CHANGE UP
WBC # BLD: 7.5 K/UL — SIGNIFICANT CHANGE UP (ref 3.8–10.5)
WBC # FLD AUTO: 7.5 K/UL — SIGNIFICANT CHANGE UP (ref 3.8–10.5)

## 2025-03-01 PROCEDURE — 74177 CT ABD & PELVIS W/CONTRAST: CPT | Mod: 26

## 2025-03-01 PROCEDURE — 99223 1ST HOSP IP/OBS HIGH 75: CPT

## 2025-03-01 PROCEDURE — 71260 CT THORAX DX C+: CPT | Mod: 26

## 2025-03-01 PROCEDURE — 76705 ECHO EXAM OF ABDOMEN: CPT | Mod: 26

## 2025-03-01 RX ORDER — ONDANSETRON HCL/PF 4 MG/2 ML
4 VIAL (ML) INJECTION EVERY 8 HOURS
Refills: 0 | Status: DISCONTINUED | OUTPATIENT
Start: 2025-03-01 | End: 2025-03-07

## 2025-03-01 RX ORDER — IBUPROFEN 200 MG
600 TABLET ORAL EVERY 8 HOURS
Refills: 0 | Status: DISCONTINUED | OUTPATIENT
Start: 2025-03-01 | End: 2025-03-07

## 2025-03-01 RX ORDER — ALBUTEROL SULFATE 2.5 MG/3ML
2 VIAL, NEBULIZER (ML) INHALATION EVERY 6 HOURS
Refills: 0 | Status: DISCONTINUED | OUTPATIENT
Start: 2025-03-01 | End: 2025-03-07

## 2025-03-01 RX ORDER — MELATONIN 5 MG
3 TABLET ORAL AT BEDTIME
Refills: 0 | Status: DISCONTINUED | OUTPATIENT
Start: 2025-03-01 | End: 2025-03-07

## 2025-03-01 RX ORDER — BENZONATATE 100 MG
100 CAPSULE ORAL THREE TIMES A DAY
Refills: 0 | Status: COMPLETED | OUTPATIENT
Start: 2025-03-01 | End: 2025-03-06

## 2025-03-01 RX ORDER — ENOXAPARIN SODIUM 100 MG/ML
40 INJECTION SUBCUTANEOUS EVERY 24 HOURS
Refills: 0 | Status: DISCONTINUED | OUTPATIENT
Start: 2025-03-01 | End: 2025-03-07

## 2025-03-01 RX ORDER — SOD PHOS DI, MONO/K PHOS MONO 250 MG
1 TABLET ORAL ONCE
Refills: 0 | Status: COMPLETED | OUTPATIENT
Start: 2025-03-01 | End: 2025-03-01

## 2025-03-01 RX ADMIN — Medication 600 MILLIGRAM(S): at 16:04

## 2025-03-01 RX ADMIN — ENOXAPARIN SODIUM 40 MILLIGRAM(S): 100 INJECTION SUBCUTANEOUS at 13:33

## 2025-03-01 RX ADMIN — Medication 1 PACKET(S): at 08:13

## 2025-03-01 RX ADMIN — Medication 600 MILLIGRAM(S): at 16:34

## 2025-03-01 RX ADMIN — Medication 100 MILLIGRAM(S): at 22:18

## 2025-03-01 RX ADMIN — Medication 100 MILLIGRAM(S): at 13:33

## 2025-03-01 NOTE — DISCHARGE NOTE PROVIDER - NSDCFUADDAPPT_GEN_ALL_CORE_FT
APPTS ARE READY TO BE MADE: [X] YES    Best Family or Patient Contact (if needed):    Additional Information about above appointments (if needed):    1: PCP in 1-2 weeks  2:   3:     Other comments or requests:    APPTS ARE READY TO BE MADE: [X] YES    Best Family or Patient Contact (if needed):    Additional Information about above appointments (if needed):    1: PCP in 1-2 weeks  2:   3:     Other comments or requests:     Patient informed us they already have secured a follow up appointment which is not visible on [facility].    PCP - Community Provider  Toya Stark NP  03/21/2025  [address] [place] [address]

## 2025-03-01 NOTE — CHART NOTE - NSCHARTNOTEFT_GEN_A_CORE
Vital Signs Last 24 Hrs  T(C): 36.8 (01 Mar 2025 03:47), Max: 39 (28 Feb 2025 20:46)  T(F): 98.3 (01 Mar 2025 03:47), Max: 102.2 (28 Feb 2025 20:46)  HR: 76 (01 Mar 2025 03:47) (69 - 103)  BP: 115/75 (01 Mar 2025 03:47) (115/75 - 128/77)  BP(mean): 85 (01 Mar 2025 03:47) (85 - 88)  RR: 16 (01 Mar 2025 03:47) (16 - 22)  SpO2: 97% (01 Mar 2025 03:47) (95% - 98%)  Parameters below as of 01 Mar 2025 03:47  Patient On (Oxygen Delivery Method): room air    Labs reviewed  wbc 10.7 with lymphocytosis  hgb14  Elevated liver enzymes         Laury 1.8    UA   sp gr 1032    CT abdomen  grossly unremarkable except trace free pelvic fluid.    Impression   44 year old man with no medical of note with about 2 weeks of flu-like illness with URI symptoms still persistent despite outpatient treatment with Tamiflu. Work up here shows no evidence of pneumonia or other causes of sepsis and a viral cause remains high on the differential especially with lymphocytic leucocytosis. Elevated liver enzymes ( usually transient) can occur with many viruses including the respiratory viruses like  flu and covid 19 as well as others like EBV, HSV, CMV, VZV and viral hepatitis virus.    Plan   Admit for symptomatic management   Supportive and symptomatic care   IVF hydration   IF liver enzymes remain elevated; check viral hepatitis and RUQ US  Monitor closely

## 2025-03-01 NOTE — PATIENT PROFILE ADULT - NSPROPOAPRESSUREINJURY_GEN_A_NUR
"Answers for HPI/ROS submitted by the patient on 11/9/2020   activity change: No  rhinorrhea: No  trouble swallowing: No  visual disturbance: No  chest tightness: No  polyuria: No  difficulty urinating: No  menstrual problem: No  joint swelling: No  arthralgias: No  confusion: No  dysphoric mood: No  Clinic Note  11/11/2020      Subjective:     Chief Complaint: Establish Care (knot on neck, sore 1.5 wk)     Patient ID:  Leticia is a 32 y.o. lady presenting to clinic for neck pain and a "knot" in her neck. She states she noticed the "knot" 1.5 weeks ago on the right side of her neck. There was no associated trauma. She was experiencing neck pain and headaches at the time and rates the pain as 4-5/10. Hot/cold compresses and tylenol helped ease the pain. She states the neck pain is positional. She has a history of neck and back pain since 2018 but has resolved in 2019 with PT. She denies any fevers, chills, night sweats, weight loss. She denies also any recent colds or family members that are sick. She was seeing Dr. Barksdale but needs a new PCP as Dr. Barksdale is no longer available.     Family History   Problem Relation Age of Onset    Cancer Mother 49        breast     Diabetes Father     Hypertension Father     Diabetes Maternal Grandfather     Pacemaker/defibrilator Maternal Grandmother     Lung cancer Paternal Grandfather     Colon cancer Neg Hx     Ovarian cancer Neg Hx     Amblyopia Neg Hx     Blindness Neg Hx     Cataracts Neg Hx     Glaucoma Neg Hx     Macular degeneration Neg Hx     Retinal detachment Neg Hx     Strabismus Neg Hx      Social History     Socioeconomic History    Marital status: Single     Spouse name: Not on file    Number of children: Not on file    Years of education: Not on file    Highest education level: Not on file   Occupational History    Occupation: HIM    Social Needs    Financial resource strain: Not hard at all    Food insecurity     Worry: Never true     " Inability: Never true    Transportation needs     Medical: No     Non-medical: No   Tobacco Use    Smoking status: Never Smoker    Smokeless tobacco: Never Used   Substance and Sexual Activity    Alcohol use: Yes     Alcohol/week: 1.0 standard drinks     Types: 1 Glasses of wine per week     Frequency: 2-4 times a month     Drinks per session: 1 or 2     Binge frequency: Never     Comment: occasional    Drug use: No    Sexual activity: Yes     Partners: Male     Birth control/protection: None   Lifestyle    Physical activity     Days per week: 0 days     Minutes per session: 0 min    Stress: To some extent   Relationships    Social connections     Talks on phone: More than three times a week     Gets together: Once a week     Attends Hinduism service: Not on file     Active member of club or organization: Yes     Attends meetings of clubs or organizations: 1 to 4 times per year     Relationship status: Living with partner   Other Topics Concern    Not on file   Social History Narrative    Not on file     Past Surgical History:   Procedure Laterality Date    Fractional D&C Conization of the Cervix  2015    [facility]    WISDOM TOOTH EXTRACTION       Medication List with Changes/Refills   Current Medications    ERGOCALCIFEROL (ERGOCALCIFEROL) 50,000 UNIT CAP    Take 1 capsule (50,000 Units total) by mouth every 7 days.    LIDOCAINE HCL 2% (XYLOCAINE) 2 % JELLY    Apply topically as needed.    MULTIVIT WITH CALCIUM,IRON,MIN (WOMEN'S MULTIPLE VITAMINS ORAL)    Take by mouth once daily.      Patient Active Problem List   Diagnosis    Severe dysplasia of cervix     Review of Systems   HENT: Negative for hearing loss.    Eyes: Negative for discharge.   Respiratory: Negative for wheezing.    Cardiovascular: Negative for chest pain and palpitations.   Gastrointestinal: Negative for blood in stool, constipation, diarrhea and vomiting.   Genitourinary: Negative for dysuria and hematuria.   Musculoskeletal: Positive for  "neck pain.   Neurological: Negative for weakness and headaches.   Endo/Heme/Allergies: Negative for polydipsia.         Objective:      /82 (BP Location: Left arm, Patient Position: Sitting, BP Method: Medium (Manual))   Pulse 85   Temp 97 °F (36.1 °C) (Temporal)   Ht 5' 9" (1.753 m)   Wt 91.2 kg (201 lb 1 oz)   LMP 10/20/2020 (Approximate)   SpO2 100%   BMI 29.69 kg/m²   Estimated body mass index is 29.69 kg/m² as calculated from the following:    Height as of this encounter: 5' 9" (1.753 m).    Weight as of this encounter: 91.2 kg (201 lb 1 oz).  Physical Exam   Constitutional: She is oriented to person, place, and time and well-developed, well-nourished, and in no distress.   HENT:   Head: Normocephalic and atraumatic.   Mildly enlarged right tonsil   Neck: Normal range of motion. Neck supple. Normal carotid pulses and no JVD present. Muscular tenderness present. Carotid bruit is not present. No neck rigidity. No edema and normal range of motion present. No thyroid mass and no thyromegaly present.       Neck mass   Cardiovascular: Normal rate, regular rhythm and normal heart sounds.   Pulmonary/Chest: Effort normal and breath sounds normal. No stridor. No respiratory distress. She has no wheezes.   Abdominal: Soft. Bowel sounds are normal. She exhibits no distension. There is no abdominal tenderness. There is no rebound.   Musculoskeletal: Normal range of motion.         General: No deformity or edema.   Neurological: She is alert and oriented to person, place, and time.   Skin: Skin is warm and dry.   Psychiatric: Affect normal.         Assessment and Plan:     This is Leticia Hendrickson, a 32 year old lady presenting to clinic for neck pain and a "knot"    Neck mass  Complains of pain at the site on and off. Warm/cold compresses and tylenol help with the pain. Denies fevers, chills, night sweats, or weight loss. She denies any sick contacts. On examination, there is a nontender, nonerythematous mass on " the right posterior part of the neck.     Plan:  - CBC, CMP  - Pregnancy Test and then CT of neck with contrast      Nichole Kimble, MS4     no

## 2025-03-01 NOTE — PATIENT PROFILE ADULT - FUNCTIONAL ASSESSMENT - DAILY ACTIVITY ASSESSMENT TYPE
"  Problem: Patient Care Overview  Goal: Plan of Care Review  Outcome: Ongoing (interventions implemented as appropriate)  Flowsheets  Taken 8/14/2020 6912  Progress: improving  Outcome Summary: patient VS are WDL, states \" feeling better today\", O2 on 2L NC, will continue to monitor.  Taken 8/14/2020 0230  Plan of Care Reviewed With: patient     " Admission

## 2025-03-01 NOTE — H&P ADULT - NSHPREVIEWOFSYSTEMS_GEN_ALL_CORE
REVIEW OF SYSTEMS:  CONSTITUTIONAL: + fevers or chills  EYES: No conjunctiva redness, No eye discharge  ENT: No nasal congestion, No sore throat, No ear pain,   NECK: No pain or stiffness  RESPIRATORY: No cough, SOB,  wheezing, hemoptysis  CARDIOVASCULAR: No chest pain or  palpitations  GASTROINTESTINAL: No abdominal pain. No nausea, vomiting, diarrhea or constipation.  GENITOURINARY: No dysuria, frequency or hematuria  NEUROLOGICAL: +headache,  SKIN: No itching, rashes,   All other review of systems is negative unless indicated above.

## 2025-03-01 NOTE — H&P ADULT - ASSESSMENT
44 M PMHx on Descovy for PrEP presents to ED with chief complaint of fevers/chills/myalgias for past 5 days. Flu/Covid Neg. Patient being admitted to medicine for management of sepsis and transaminitis.

## 2025-03-01 NOTE — DISCHARGE NOTE PROVIDER - NSDCMRMEDTOKEN_GEN_ALL_CORE_FT
Descovy 200 mg-25 mg oral tablet: 1 tab(s) orally once a day  oxycodone-acetaminophen 5 mg-325 mg oral tablet: 1 tab(s) orally every 6 hours MDD:4   Descovy 200 mg-25 mg oral tablet: 1 tab(s) orally once a day  ibuprofen 600 mg oral tablet: 1 tab(s) orally every 8 hours As needed Temp greater or equal to 38C (100.4F), Mild Pain (1 - 3)  melatonin 3 mg oral tablet: 1 tab(s) orally once a day (at bedtime) As needed Insomnia

## 2025-03-01 NOTE — H&P ADULT - ATTENDING COMMENTS
Vital Signs Last 24 Hrs  T(C): 36.8 (01 Mar 2025 03:47), Max: 39 (28 Feb 2025 20:46)  T(F): 98.3 (01 Mar 2025 03:47), Max: 102.2 (28 Feb 2025 20:46)  HR: 76 (01 Mar 2025 03:47) (69 - 103)  BP: 115/75 (01 Mar 2025 03:47) (115/75 - 128/77)  BP(mean): 85 (01 Mar 2025 03:47) (85 - 88)  RR: 16 (01 Mar 2025 03:47) (16 - 22)  SpO2: 97% (01 Mar 2025 03:47) (95% - 98%)  Parameters below as of 01 Mar 2025 03:47  Patient On (Oxygen Delivery Method): room air    Labs reviewed  wbc 10.7 with lymphocytosis  hgb14  Elevated liver enzymes         Laury 1.8    UA   sp gr 1032    CT abdomen  grossly unremarkable except trace free pelvic fluid.    Impression   44 year old man with no medical hx of note with about 2 weeks of flu-like illness with URI symptoms still persistent despite outpatient treatment with Tamiflu. Work up here shows no evidence of pneumonia or other causes of sepsis and a viral cause remains high on the differential especially with lymphocytic leucocytosis. Elevated liver enzymes ( usually transient) can occur with many viruses including the respiratory viruses like  flu and covid 19 as well as others like EBV, HSV, CMV, VZV and viral hepatitis virus.    Plan   Admit for symptomatic management   Supportive and symptomatic care   antipyretics, antitussive, bronchodilators as needed.  IVF hydration   IF liver enzymes remain elevated; check viral hepatitis and RUQ US  Monitor closely

## 2025-03-01 NOTE — H&P ADULT - NSHPPHYSICALEXAM_GEN_ALL_CORE
T(C): 36.8 (03-01-25 @ 03:47), Max: 39 (02-28-25 @ 20:46)  HR: 76 (03-01-25 @ 03:47) (69 - 103)  BP: 115/75 (03-01-25 @ 03:47) (115/75 - 128/77)  RR: 16 (03-01-25 @ 03:47) (16 - 22)  SpO2: 97% (03-01-25 @ 03:47) (95% - 98%)    GENERAL: NAD, speaks in full sentences, no signs of respiratory distress  HEAD:  Atraumatic, Normocephalic  EYES: EOMI, PERRLA, conjunctiva and sclera clear  NECK: Supple, No JVD  CHEST/LUNG: Clear to auscultation bilaterally; No wheeze; No crackles; No accessory muscles used  HEART: Regular rate and rhythm; No murmurs;   ABDOMEN: Soft, Nontender, Nondistended; Bowel sounds present; No guarding  EXTREMITIES:  2+ Peripheral Pulses, No cyanosis or edema  PSYCH: AAOx3  NEUROLOGY: non-focal  SKIN: Possible petechial rashes? on bilateral legs, patient reports that his legs do not normally look like this

## 2025-03-01 NOTE — DISCHARGE NOTE PROVIDER - ATTENDING DISCHARGE PHYSICAL EXAMINATION:
CONSTITUTIONAL: Well appearing, well nourished, awake, alert and in no apparent distress  CARDIAC: Normal rate, regular rhythm.  Heart sounds S1, S2  GI: abd soft, non tender  RESPIRATORY: Breath sounds clear and equal bilaterally  EXTREMITIES: No edema, cyanosis or deformity   NEUROLOGICAL: Alert and oriented, no focal deficits, no motor or sensory deficits.    44M with no PMH who presented with 2 weeks of flu-like illness with URI symptoms despite outpatient treatment with Tamiflu. Work up here shows no evidence of pneumonia or other causes of sepsis likely viral etiology now admitted for further management. 44M with no PMH who presented with 2 weeks of flu-like illness with URI symptoms despite outpatient treatment with Tamiflu. Work up here shows no evidence of pneumonia or other causes of sepsis likely viral etiology now admitted for further management. ID following - started on CTX. Blood cx negative. Indium scan done which was negative. CMV IgM, IgG, and PCR positive - likely cause of sx and fevers. Also found with transaminitis but imaging unremarkable - downtrending now, likely 2/2 viral infection. Advised to follow-up with PCP in 1-2 weeks for repeat blood work to ensure LFTs continue to downtrend. Patient agreeable with plan.  Cigarettes

## 2025-03-01 NOTE — H&P ADULT - PROBLEM SELECTOR PLAN 3
pt reports taking paracetamol q8 for several days    f/u hepatitis panel  RUQ US  ibuprofen for fever/pain control

## 2025-03-01 NOTE — PATIENT PROFILE ADULT - FALL HARM RISK - HARM RISK INTERVENTIONS
Assistance with ambulation/Communicate Risk of Fall with Harm to all staff/Monitor for mental status changes/Monitor gait and stability/Reinforce activity limits and safety measures with patient and family/Reorient to person, place and time as needed/Review medications for side effects contributing to fall risk/Sit up slowly, dangle for a short time, stand at bedside before walking/Tailored Fall Risk Interventions/Use of alarms - bed, chair and/or voice tab/Visual Cue: Yellow wristband and red socks/Bed in lowest position, wheels locked, appropriate side rails in place/Call bell, personal items and telephone in reach/Instruct patient to call for assistance before getting out of bed or chair/Non-slip footwear when patient is out of bed/[place] to call system/Physically safe environment - no spills, clutter or unnecessary equipment/Purposeful Proactive Rounding/Room/bathroom lighting operational, light cord in reach

## 2025-03-01 NOTE — H&P ADULT - PROBLEM SELECTOR PLAN 2
patient reports he has been taking Descovy for past few years for PrEP (pre-exposure prophylaxis), and denies current history of HIV  reports self discontinuation of Descovy last week  further chart review mentions hx of HIV from 2021    f/u rapid HIV, HIV viral load (r/o acute HIV syndrome)

## 2025-03-01 NOTE — DISCHARGE NOTE PROVIDER - HOSPITAL COURSE
44 year old male with no medical history, on Descovy for PREP presents to ED with chief complaint of fevers/chills/myalgias for past 5 days. Patient reports that he had URI symptoms that started two weeks ago, with associated headache, dizziness and body aches. About 4-5 days ago, is when pt reports having fevers, shortness of breath and continued dizziness. Patient reports he started taking Tylenol every 8 hours for a few days, however the fever would return each time. Last saw PCP on Feb 25 for which he was given Tamiflu which he took, however also reports that he did a Flu / COVID test as well at the time which was negative. Denies any sore throat, rhinorrhea, cough. Denies and nausea, vomiting, diarrhea or abdominal pain. 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 Denies any recent travel. Reports being up to date on COVID / flu vaccination.   Of note patient reports self-discontinuing Descovy medication last week for PREP, for which he has been taking for a few years.   Patient was admitted for sepsis and Symptom management.   Patient's urine tox screen was negative  He was found to have transaminitis,  Abdominal ultrasound was negative for any acute findings. 44 year old male with no medical history, on Descovy for PREP presents to ED with chief complaint of fevers/chills/myalgias for past 5 days. Patient reports that he had URI symptoms that started two weeks ago, with associated headache, dizziness and body aches. About 4-5 days ago, is when pt reports having fevers, shortness of breath and continued dizziness. Patient reports he started taking Tylenol every 8 hours for a few days, however the fever would return each time. Last saw PCP on Feb 25 for which he was given Tamiflu which he took, however also reports that he did a Flu / COVID test as well at the time which was negative. Denies any sore throat, rhinorrhea, cough. Denies and nausea, vomiting, diarrhea or abdominal pain. 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 Denies any recent travel. Reports being up to date on COVID / flu vaccination.   Of note patient reports self-discontinuing Descovy medication last week for PREP, for which he has been taking for a few years.   Patient was admitted for sepsis and Symptom management.   Patient's urine tox screen was negative  He was found to have transaminitis,  Abdominal ultrasound was negative for any acute findings.  idium scan negative. CMP PCR +.   Pt was followed by Infectious disease. Pt is optimized for d/c with PCP f/u in 2 weeks. 44 year old male with no medical history, on Descovy for PREP presents to ED with chief complaint of fevers/chills/myalgias for past 5 days. Patient reports that he had URI symptoms that started two weeks ago, with associated headache, dizziness and body aches. About 4-5 days ago, is when pt reports having fevers, shortness of breath and continued dizziness. Patient reports he started taking Tylenol every 8 hours for a few days, however the fever would return each time. Last saw PCP on Feb 25 for which he was given Tamiflu which he took, however also reports that he did a Flu / COVID test as well at the time which was negative. Denies any sore throat, rhinorrhea, cough. Denies and nausea, vomiting, diarrhea or abdominal pain. 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 Denies any recent travel. Reports being up to date on COVID / flu vaccination.   Of note patient reports self-discontinuing Descovy medication last week for PREP, for which he has been taking for a few years.   Patient was admitted for sepsis and Symptom management.   Patient's urine tox screen was negative  He was found to have transaminitis,  Abdominal ultrasound was negative for any acute findings.  Idium scan negative. CMP PCR +.   Pt was followed by Infectious disease. Pt is optimized for d/c with PCP f/u in 2 weeks.

## 2025-03-01 NOTE — H&P ADULT - HISTORY OF PRESENT ILLNESS
44 M PMHx on Descovy for PrEP presents to ED with chief complaint of fevers/chills/myalgias for past 5 days. Patient reports that he had URI symptoms that started two weeks ago, with associated headache, dizziness and body aches. About 4-5 days ago, is when pt reports having fevers, shortness of breath and continued dizziness. Pt reports he started taking tylenol every 8 hours for a few days, however the fever would return each time. Last saw PCP on Feb 25 for which he was given Tamiflu which he took, however also reports that he did a Flu/COVID test as well at the time which was negative. Denies any sore throat, rhinorrhea, cough. Denies and nausea, vomiting, diarrhea or abdominal pain. 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 Denies any recent travel. Reports being up to date on COVID/flu vaccination. Of note patient reports self-discontinuing Descovy medication last week for PReP, for which he has been taking for a few years.  44 M PMHx on Descovy for PrEP presents to ED with chief complaint of fevers/chills/myalgias for past 5 days. Patient reports that he had URI symptoms that started two weeks ago, with associated headache, dizziness and body aches. About 4-5 days ago, is when pt reports having fevers, shortness of breath and continued dizziness. Pt reports he started taking tylenol every 8 hours for a few days, however the fever would return each time. Last saw PCP on Feb 25 for which he was given Tamiflu which he took, however also reports that he did a Flu/COVID test as well at the time which was negative. Denies any sore throat, rhinorrhea, cough. Denies and nausea, vomiting, diarrhea or abdominal pain. 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 Denies any recent travel. Reports being up to date on COVID/flu vaccination. Of note patient reports self-discontinuing Descovy medication last week for PReP, for which he has been taking for a few years.     In ED:   T 102.2, , 128/77, SpO2 05 RA RR 22  CT Abd/Pelvis: No acute findings  s/p 2300 LR bolus, ceftriaxone

## 2025-03-01 NOTE — DISCHARGE NOTE PROVIDER - NSDCCPCAREPLAN_GEN_ALL_CORE_FT
PRINCIPAL DISCHARGE DIAGNOSIS  Diagnosis: Fever  Assessment and Plan of Treatment:       SECONDARY DISCHARGE DIAGNOSES  Diagnosis: Transaminitis  Assessment and Plan of Treatment:      PRINCIPAL DISCHARGE DIAGNOSIS  Diagnosis: CMV infection  Assessment and Plan of Treatment: YOu presented with pesistent fever. Idium scan was negative . YOU DMV PCR was +. you are found to have acute Cytomegalovirus (CMV)   Cytomegalovirus (CMV) spreads through contact with body fluids, such as blood, saliva, urine, and tears. It affects both adults and children. Most healthy people do not have symptoms and recover without knowing they are infected. CMV can be spread for months to years after someone is infected. Over time, it can become dormant (inactive) and is less likely to spread. CMV may become active again when a person's immune system becomes weak, such as with an HIV infection or an organ or bone marrow transplant.  DISCHARGE INSTRUCTIONS:  Call your local emergency number (911 in the [place]), or have someone call if:  You have a seizure.  You cannot be woken.  You have trouble breathing.  Seek care immediately if:  You have severe abdominal pain.  Call your doctor if:  You have new or worsening symptoms.  Your symptoms return after treatment.  Prevent the spread of acquired CMV: Wash your hands often. Always wash your hands well after you use the toilet, diaper a child, and before you prepare or serve food. Use soap and water every time you wash your hands. Rub your soapy hands together, lacing your fingers. Use the fingers of one hand to scrub under the nails of the other hand. Wash for at least 20 seconds. Rinse with warm, running water for several seconds. Then dry your hands with a clean towel or paper towel. Use hand  that contains alcohol if soap and water are not available. Do not touch your eyes, nose, or mouth without washing your hands first.      SECONDARY DISCHARGE DIAGNOSES  Diagnosis: Transaminitis  Assessment and Plan of Treatment: Your liver enzymes were mildly elevated likey from infection. it is trending down.   ultrasound of abdomen in unremarkable   make sure you follow up with PCP to check your liver enzymes in 2 weeks.     PRINCIPAL DISCHARGE DIAGNOSIS  Diagnosis: CMV infection  Assessment and Plan of Treatment: YOu presented with pesistent fever. Idium scan was negative . YOU DMV PCR was +. you are found to have acute Cytomegalovirus (CMV)   Cytomegalovirus (CMV) spreads through contact with body fluids, such as blood, saliva, urine, and tears. It affects both adults and children. Most healthy people do not have symptoms and recover without knowing they are infected. CMV can be spread for months to years after someone is infected. Over time, it can become dormant (inactive) and is less likely to spread. CMV may become active again when a person's immune system becomes weak, such as with an HIV infection or an organ or bone marrow transplant.  DISCHARGE INSTRUCTIONS:  Call your local emergency number (911 in the [place]), or have someone call if:  You have a seizure.  You cannot be woken.  You have trouble breathing.  Seek care immediately if:  You have severe abdominal pain.  Call your doctor if:  You have new or worsening symptoms.  Your symptoms return after treatment.  Prevent the spread of acquired CMV: Wash your hands often. Always wash your hands well after you use the toilet, diaper a child, and before you prepare or serve food. Use soap and water every time you wash your hands. Rub your soapy hands together, lacing your fingers. Use the fingers of one hand to scrub under the nails of the other hand. Wash for at least 20 seconds. Rinse with warm, running water for several seconds. Then dry your hands with a clean towel or paper towel. Use hand  that contains alcohol if soap and water are not available. Do not touch your eyes, nose, or mouth without washing your hands first.  Please schedule an appointment with your primary care provider within 1-2 weeks of being discahrged.      SECONDARY DISCHARGE DIAGNOSES  Diagnosis: Transaminitis  Assessment and Plan of Treatment: Your liver enzymes were mildly elevated likely from infection. it is trending down.   ultrasound of abdomen in unremarkable   make sure you follow up with PCP to check your liver enzymes in 2 weeks.

## 2025-03-01 NOTE — H&P ADULT - PROBLEM SELECTOR PLAN 1
p/w URI symptoms, fevers/chills/myalgias  VS: T 102.2, , 128/77, SpO2 05 RA RR 22  PEx:  WBC 10  flu/covid negative  CT Chest/Abd/Pelvis: Negative for acute pathology   s/p 2300 LR bolus, ceftriaxone in ED    f/u blood cultures, urine cultures  f/u full RVP  f/u rapid HIV, HIV viral load (r/o acute HIV syndrome)  c/w ceftriaxone  ua neg  ibuprofen q8 prn p/w URI symptoms, fevers/chills/myalgias  VS: T 102.2, , 128/77, SpO2 05 RA RR 22  WBC 10  flu/covid negative  CT Chest/Abd/Pelvis: Negative for acute pathology   s/p 2300 LR bolus, ceftriaxone in ED    f/u blood cultures, urine cultures  f/u full RVP  f/u rapid HIV, HIV viral load (r/o acute HIV syndrome)  c/w ceftriaxone  ua neg  ibuprofen q8 prn p/w URI symptoms, fevers/chills/myalgias  VS: T 102.2, , 128/77, SpO2 05 RA RR 22  WBC 10  flu/covid negative  CT Chest/Abd/Pelvis: Negative for acute pathology   s/p 2300 LR bolus, ceftriaxone in ED    f/u blood cultures, urine cultures  f/u full RVP  f/u rapid HIV, HIV viral load (r/o acute HIV syndrome)  ua neg  ibuprofen q8 prn

## 2025-03-01 NOTE — CHART NOTE - NSCHARTNOTEFT_GEN_A_CORE
43yo M w/ no PMHx who p/w 2 weeks of flu-like illness with URI symptoms still persistent despite outpatient treatment with Tamiflu. Work up here shows no evidence of pneumonia or other causes of sepsis likely viral etiology now admitted for further management.     This AM at bedside feeling much better no chills or fevers overnight able to sleep. Discussed HIV neg and RVP/Covid also negative. US also negative, will await hep panel and c/w IVF, agreeable and will likely dc tmrw.     Exam:  NAD   RRR  CTABL  Abdo NTND BS+  Ext wwp   Anox3 no FND     Labs and Imaging reviewed.                       12.9   7.50  )-----------( 152      ( 01 Mar 2025 05:00 )             38.3   142  |  110[H]  |  8   ----------------------------( 92     - @ 05:00  4.3   |  26  |  0.80    Ca: 8.0[L]   Phos: 2.0[L]   M.0    TPro: 6.1 / Alb: 2.9[L] /  TBili 0.5 / DBili x  /  [H] /  [H] /  AlkPhos  120    CT abdomen  grossly unremarkable except trace free pelvic fluid.    Plan:   -Supportive care   -IVF hydration and encourage PO intake   -AST/ALT still elevated can trend; f/u viral hepatitis  -s/p 3 doses of tamiflu no need to continue here neg on repeat swab

## 2025-03-02 LAB
ALBUMIN SERPL ELPH-MCNC: 3.3 G/DL — LOW (ref 3.5–5)
ALP SERPL-CCNC: 152 U/L — HIGH (ref 40–120)
ALT FLD-CCNC: 493 U/L DA — HIGH (ref 10–60)
ANION GAP SERPL CALC-SCNC: 9 MMOL/L — SIGNIFICANT CHANGE UP (ref 5–17)
AST SERPL-CCNC: 302 U/L — HIGH (ref 10–40)
BILIRUB SERPL-MCNC: 0.7 MG/DL — SIGNIFICANT CHANGE UP (ref 0.2–1.2)
BUN SERPL-MCNC: 9 MG/DL — SIGNIFICANT CHANGE UP (ref 7–18)
CALCIUM SERPL-MCNC: 8.6 MG/DL — SIGNIFICANT CHANGE UP (ref 8.4–10.5)
CHLORIDE SERPL-SCNC: 107 MMOL/L — SIGNIFICANT CHANGE UP (ref 96–108)
CO2 SERPL-SCNC: 24 MMOL/L — SIGNIFICANT CHANGE UP (ref 22–31)
CREAT SERPL-MCNC: 0.9 MG/DL — SIGNIFICANT CHANGE UP (ref 0.5–1.3)
EGFR: 107 ML/MIN/1.73M2 — SIGNIFICANT CHANGE UP
EGFR: 107 ML/MIN/1.73M2 — SIGNIFICANT CHANGE UP
GLUCOSE SERPL-MCNC: 101 MG/DL — HIGH (ref 70–99)
HCT VFR BLD CALC: 40.7 % — SIGNIFICANT CHANGE UP (ref 39–50)
HGB BLD-MCNC: 14.2 G/DL — SIGNIFICANT CHANGE UP (ref 13–17)
MAGNESIUM SERPL-MCNC: 2.1 MG/DL — SIGNIFICANT CHANGE UP (ref 1.6–2.6)
MCHC RBC-ENTMCNC: 31.4 PG — SIGNIFICANT CHANGE UP (ref 27–34)
MCHC RBC-ENTMCNC: 34.9 G/DL — SIGNIFICANT CHANGE UP (ref 32–36)
MCV RBC AUTO: 90 FL — SIGNIFICANT CHANGE UP (ref 80–100)
NRBC BLD AUTO-RTO: 0 /100 WBCS — SIGNIFICANT CHANGE UP (ref 0–0)
PHOSPHATE SERPL-MCNC: 2.3 MG/DL — LOW (ref 2.5–4.5)
PLATELET # BLD AUTO: 188 K/UL — SIGNIFICANT CHANGE UP (ref 150–400)
POTASSIUM SERPL-MCNC: 3.8 MMOL/L — SIGNIFICANT CHANGE UP (ref 3.5–5.3)
POTASSIUM SERPL-SCNC: 3.8 MMOL/L — SIGNIFICANT CHANGE UP (ref 3.5–5.3)
PROT SERPL-MCNC: 7 G/DL — SIGNIFICANT CHANGE UP (ref 6–8.3)
RBC # BLD: 4.52 M/UL — SIGNIFICANT CHANGE UP (ref 4.2–5.8)
RBC # FLD: 11.4 % — SIGNIFICANT CHANGE UP (ref 10.3–14.5)
SODIUM SERPL-SCNC: 140 MMOL/L — SIGNIFICANT CHANGE UP (ref 135–145)
WBC # BLD: 10.16 K/UL — SIGNIFICANT CHANGE UP (ref 3.8–10.5)
WBC # FLD AUTO: 10.16 K/UL — SIGNIFICANT CHANGE UP (ref 3.8–10.5)

## 2025-03-02 PROCEDURE — 99233 SBSQ HOSP IP/OBS HIGH 50: CPT

## 2025-03-02 RX ORDER — SOD PHOS DI, MONO/K PHOS MONO 250 MG
1 TABLET ORAL ONCE
Refills: 0 | Status: COMPLETED | OUTPATIENT
Start: 2025-03-02 | End: 2025-03-02

## 2025-03-02 RX ADMIN — Medication 100 MILLIGRAM(S): at 13:23

## 2025-03-02 RX ADMIN — Medication 600 MILLIGRAM(S): at 17:54

## 2025-03-02 RX ADMIN — Medication 600 MILLIGRAM(S): at 05:00

## 2025-03-02 RX ADMIN — Medication 600 MILLIGRAM(S): at 17:06

## 2025-03-02 RX ADMIN — Medication 100 MILLIGRAM(S): at 05:01

## 2025-03-02 RX ADMIN — Medication 100 MILLIGRAM(S): at 21:41

## 2025-03-02 RX ADMIN — ENOXAPARIN SODIUM 40 MILLIGRAM(S): 100 INJECTION SUBCUTANEOUS at 13:23

## 2025-03-02 RX ADMIN — Medication 600 MILLIGRAM(S): at 06:39

## 2025-03-02 RX ADMIN — Medication 1 PACKET(S): at 11:45

## 2025-03-02 NOTE — PROGRESS NOTE ADULT - SUBJECTIVE AND OBJECTIVE BOX
Patient is a 44y old  Male who presents with a chief complaint of Sepsis and transaminitis (01 Mar 2025 15:17)      INTERVAL HPI/OVERNIGHT EVENTS: no events noted overnight.    MEDICATIONS  (STANDING):  benzonatate 100 milliGRAM(s) Oral three times a day  enoxaparin Injectable 40 milliGRAM(s) SubCutaneous every 24 hours    MEDICATIONS  (PRN):  albuterol    90 MICROgram(s) HFA Inhaler 2 Puff(s) Inhalation every 6 hours PRN Bronchospasm  ibuprofen  Tablet. 600 milliGRAM(s) Oral every 8 hours PRN Temp greater or equal to 38C (100.4F), Mild Pain (1 - 3)  melatonin 3 milliGRAM(s) Oral at bedtime PRN Insomnia  ondansetron Injectable 4 milliGRAM(s) IV Push every 8 hours PRN Nausea and/or Vomiting      __________________________________________________  REVIEW OF SYSTEMS:    CONSTITUTIONAL: No fever,   EYES: no acute visual disturbances  NECK: No pain or stiffness  RESPIRATORY: No cough; No shortness of breath  CARDIOVASCULAR: No chest pain, no palpitations  GASTROINTESTINAL: No pain. No nausea or vomiting; No diarrhea   NEUROLOGICAL: No headache or numbness, no tremors  MUSCULOSKELETAL: No joint pain, no muscle pain  GENITOURINARY: no dysuria, no frequency, no hesitancy  PSYCHIATRY: no depression , no anxiety  ALL OTHER  ROS negative        Vital Signs Last 24 Hrs  T(C): 36.7 (02 Mar 2025 13:28), Max: 38.8 (01 Mar 2025 16:00)  T(F): 98 (02 Mar 2025 13:28), Max: 101.8 (01 Mar 2025 16:00)  HR: 79 (02 Mar 2025 13:28) (71 - 94)  BP: 98/60 (02 Mar 2025 13:28) (98/60 - 111/70)  BP(mean): 79 (02 Mar 2025 05:02) (79 - 84)  RR: 16 (02 Mar 2025 13:28) (16 - 17)  SpO2: 95% (02 Mar 2025 13:28) (95% - 96%)    Parameters below as of 02 Mar 2025 13:28  Patient On (Oxygen Delivery Method): room air        ________________________________________________  PHYSICAL EXAM:  See Below     _________________________________________________  LABS:                        14.2   10.16 )-----------( 188      ( 02 Mar 2025 05:50 )             40.7     03-02    140  |  107  |  9   ----------------------------<  101[H]  3.8   |  24  |  0.90    Ca    8.6      02 Mar 2025 05:50  Phos  2.3     03-02  Mg     2.1     03-02    TPro  7.0  /  Alb  3.3[L]  /  TBili  0.7  /  DBili  x   /  AST  302[H]  /  ALT  493[H]  /  AlkPhos  152[H]  03-02    PT/INR - ( 28 Feb 2025 21:29 )   PT: 14.3 sec;   INR: 1.22 ratio         PTT - ( 28 Feb 2025 21:29 )  PTT:30.2 sec  Urinalysis Basic - ( 02 Mar 2025 05:50 )    Color: x / Appearance: x / SG: x / pH: x  Gluc: 101 mg/dL / Ketone: x  / Bili: x / Urobili: x   Blood: x / Protein: x / Nitrite: x   Leuk Esterase: x / RBC: x / WBC x   Sq Epi: x / Non Sq Epi: x / Bacteria: x      CAPILLARY BLOOD GLUCOSE            RADIOLOGY & ADDITIONAL TESTS:    Imaging Personally Reviewed:  YES    Consultant(s) Notes Reviewed:   YES    Care Discussed with Consultants : YES     Plan of care was discussed with patient and /or primary care giver; all questions and concerns were addressed and care was aligned with patient's wishes.

## 2025-03-02 NOTE — DIETITIAN INITIAL EVALUATION ADULT - PERTINENT MEDS FT
MEDICATIONS  (STANDING):  benzonatate 100 milliGRAM(s) Oral three times a day  enoxaparin Injectable 40 milliGRAM(s) SubCutaneous every 24 hours    MEDICATIONS  (PRN):  albuterol    90 MICROgram(s) HFA Inhaler 2 Puff(s) Inhalation every 6 hours PRN Bronchospasm  ibuprofen  Tablet. 600 milliGRAM(s) Oral every 8 hours PRN Temp greater or equal to 38C (100.4F), Mild Pain (1 - 3)  melatonin 3 milliGRAM(s) Oral at bedtime PRN Insomnia  ondansetron Injectable 4 milliGRAM(s) IV Push every 8 hours PRN Nausea and/or Vomiting

## 2025-03-02 NOTE — CONSULT NOTE ADULT - ASSESSMENT
Fevers of unknown Origin  Transaminitis  R/O Endocarditis  R/O Viral infection      Plan - Repeat blood cultures x 2 sets ( will hold for 3 weeks for HACEK organisms )  ECHO  ESR, CRP, GIFTY, RF, ACE level, TOXO IGM, CMV IGM, CMV pcr, hepatitis panel  Whole body Indium scan.  will start Rocephin 2gms iv q24hrs tomorrow am after blood work taken.

## 2025-03-02 NOTE — CONSULT NOTE ADULT - SUBJECTIVE AND OBJECTIVE BOX
HPI:  44 M PMHx on Descovy for PrEP presents to ED with chief complaint of fevers/chills/myalgias for past 5 days. Patient reports that he had URI symptoms that started two weeks ago, with associated headache, dizziness and body aches. About 4-5 days ago, is when pt reports having fevers, shortness of breath and continued dizziness. Pt reports he started taking tylenol every 8 hours for a few days, however the fever would return each time. Last saw PCP on Feb 25 for which he was given Tamiflu which he took, however also reports that he did a Flu/COVID test as well at the time which was negative. Denies any sore throat, rhinorrhea, cough. Denies and nausea, vomiting, diarrhea or abdominal pain. 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 Denies any recent travel. Reports being up to date on COVID/flu vaccination. Of note patient reports self-discontinuing Descovy medication last week for PReP, for which he has been taking for a few years.     In ED:   T 102.2, , 128/77, SpO2 05 RA RR 22  CT Abd/Pelvis: No acute findings  s/p 2300 LR bolus, ceftriaxone  (01 Mar 2025 03:26)                PAST MEDICAL & SURGICAL HISTORY:  HIV disease      Unilateral inguinal hernia, without obstruction or gangrene, not specified as recurrent      Known health problems: none          No Known Allergies      Meds:  albuterol    90 MICROgram(s) HFA Inhaler 2 Puff(s) Inhalation every 6 hours PRN  benzonatate 100 milliGRAM(s) Oral three times a day  enoxaparin Injectable 40 milliGRAM(s) SubCutaneous every 24 hours  ibuprofen  Tablet. 600 milliGRAM(s) Oral every 8 hours PRN  melatonin 3 milliGRAM(s) Oral at bedtime PRN  ondansetron Injectable 4 milliGRAM(s) IV Push every 8 hours PRN      SOCIAL HISTORY:  Smoker:  YES / NO        PACK YEARS:                         WHEN QUIT?  ETOH use:  YES / NO               FREQUENCY / QUANTITY:  Ilicit Drug use:  YES / NO  Occupation:  Assisted device use (Cane / Walker):  Live with:    FAMILY HISTORY:  FHx: kidney cancer (Mother)        VITALS:  Vital Signs Last 24 Hrs  T(C): 38.9 (02 Mar 2025 17:05), Max: 38.9 (02 Mar 2025 17:05)  T(F): 102 (02 Mar 2025 17:05), Max: 102 (02 Mar 2025 17:05)  HR: 79 (02 Mar 2025 13:28) (71 - 94)  BP: 98/60 (02 Mar 2025 13:28) (98/60 - 111/70)  BP(mean): 79 (02 Mar 2025 05:02) (79 - 84)  RR: 16 (02 Mar 2025 13:28) (16 - 17)  SpO2: 95% (02 Mar 2025 13:28) (95% - 96%)    Parameters below as of 02 Mar 2025 13:28  Patient On (Oxygen Delivery Method): room air        LABS/DIAGNOSTIC TESTS:                          14.2   10.16 )-----------( 188      ( 02 Mar 2025 05:50 )             40.7     WBC Count: 10.16 K/uL (03-02 @ 05:50)  WBC Count: 7.50 K/uL (03-01 @ 05:00)  WBC Count: 10.72 K/uL (02-28 @ 21:29)      03-02    140  |  107  |  9   ----------------------------<  101[H]  3.8   |  24  |  0.90    Ca    8.6      02 Mar 2025 05:50  Phos  2.3     03-02  Mg     2.1     03-02    TPro  7.0  /  Alb  3.3[L]  /  TBili  0.7  /  DBili  x   /  AST  302[H]  /  ALT  493[H]  /  AlkPhos  152[H]  03-02      Urine Microscopic-Add On (NC) (02.28.25 @ 21:29)   White Blood Cell - Urine: 4 /HPF  Red Blood Cell - Urine: 0 /HPF  Bacteria: Many /HPF  Squamous Epithelial Cells: Present  Comment - Urine: few amorphous uratesUrinalysis (02.28.25 @ 21:29)   Blood, Urine: Negative  Glucose Qualitative, Urine: Negative mg/dL  pH Urine: 5.0  Color: Dark Yellow  Urine Appearance: Clear  Bilirubin: Small  Ketone - Urine: Trace mg/dL  Specific Gravity: 1.032  Protein, Urine: 30 mg/dL  Urobilinogen: 1.0 mg/dL  Nitrite: Negative  Leukocyte Esterase Concentration: Trace      LIVER FUNCTIONS - ( 02 Mar 2025 05:50 )  Alb: 3.3 g/dL / Pro: 7.0 g/dL / ALK PHOS: 152 U/L / ALT: 493 U/L DA / AST: 302 U/L / GGT: x             PT/INR - ( 28 Feb 2025 21:29 )   PT: 14.3 sec;   INR: 1.22 ratio         PTT - ( 28 Feb 2025 21:29 )  PTT:30.2 sec    LACTATE:    ABG -     CULTURES:   .Blood Blood-Peripheral  02-28 @ 21:29   No growth at 24 hours  --  --          RADIOLOGY:< from: US Abdomen Upper Quadrant Right (03.01.25 @ 08:52) >  ACC: [id number] EXAM:  US ABDOMEN RT UPR QUADRANT   ORDERED BY: BARBARA BACK     PROCEDURE DATE:  03/01/2025          INTERPRETATION:  CLINICAL INFORMATION: Elevated LFTs    COMPARISON: CT 3/1/2025  TECHNIQUE: Sonography of the right upper quadrant.    FINDINGS:  Liver: Within normal limits.  Bile ducts: Normal caliber. Common bile duct measures 3 mm.  Gallbladder: Within normal limits.  Pancreas: Poorly visualized.  Right kidney: 10.9 cm. No hydronephrosis.  Ascites: None.  IVC: Visualized portions are within normal limits.    IMPRESSION:  Negative right upper quadrant abdominal ultrasound.        --- End of Report ---            STEPHEN LAZCANO MD; Attending Radiologist  This document has been electronically signed. Mar  1 2025  9:04AM    < end of copied text >  --------------------------------------------------------------------------------------------------------------------------------------------  ACC: [id number] EXAM:  CT ABDOMEN AND PELVIS IC   ORDERED BY: JOSTIN SELLERS     ACC: [id number] EXAM:  CT CHEST IC   ORDERED BY: JOSTIN SELLERS     PROCEDURE DATE:  03/01/2025          INTERPRETATION:  CLINICAL INFORMATION: Sepsis. Abnormal LFTs    COMPARISON: None.    CONTRAST/COMPLICATIONS:  IV Contrast: Omnipaque 350  90 cc administered   10 cc discarded  Oral Contrast: NONE  .    PROCEDURE:  CT of the Chest, Abdomen and Pelvis was performed.  Sagittal and coronal reformats were performed.    FINDINGS:  CHEST:  LUNGS AND LARGE AIRWAYS: Patent central airways. No focal consolidation.   Mild bibasilar dependent atelectasis. No suspicious nodule.  PLEURA: No pleural effusion.  VESSELS: Within normal limits.  HEART: Heart size is normal. No pericardial effusion.  MEDIASTINUM AND JEAN PIERRE: No lymphadenopathy.  CHEST WALL AND LOWER NECK: Within normal limits.    ABDOMEN AND PELVIS:  LIVER: Within normal limits.  BILE DUCTS: Normal caliber.  GALLBLADDER: Within normal limits.  SPLEEN: Within normal limits.  PANCREAS: Within normal limits.  ADRENALS: Within normal limits.  KIDNEYS/URETERS: Within normal limits.    BLADDER: Within normal limits.  REPRODUCTIVE ORGANS: Prostate is within normal limits.    BOWEL: No bowel obstruction. Appendix is normal.  PERITONEUM/RETROPERITONEUM: Trace free pelvic fluid. No free air or   collection.  VESSELS: Within normal limits.  LYMPH NODES: No lymphadenopathy.  ABDOMINAL WALL: Within normal limits.  BONES: Within normal limits.    IMPRESSION:  Chest CT: No evidence of pneumonia. No acute intrathoracic finding.    CT abdomen/pelvis: Trace free pelvic fluid of indeterminate etiology. No   acute finding in the abdomen or pelvis otherwise.        --- End of Report ---            LISA SHAY; Attending Radiologist  This document has been electronically signed. Mar  1 2025  1:40AM    < end of copied text >  -------------------------------------------------------------------------------------------------------------------------  ACC: [id number] EXAM:  XR CHEST PA LAT 2V   ORDERED BY: JOSTIN SELLERS     PROCEDURE DATE:  02/28/2025          INTERPRETATION:  Chest AP and lateral    CLINICAL HISTORY: Sepsis, short of breath, fever    Comparison:  CT chest of 3/1/2025    Normal heart and mediastinum. Lungs clear. Angles sharp. Bones without   acute abnormality.    IMPRESSION: No acute parenchymal disease    --- End of Report ---            CHRIS MADDOX MD; Attending Radiologist  This document has been electronically signed. Mar  1 2025  9:32AM    < end of copied text >        ROS  [  ] UNABLE TO ELICIT               HPI:  44 M PMHx on Descovy for PrEP presents to ED with chief complaint of fevers/chills/myalgias for past 5 days. Patient reports that he had URI symptoms that started two weeks ago, with associated headache, dizziness and body aches. About 4-5 days ago, is when pt reports having fevers, shortness of breath and continued dizziness. Pt reports he started taking tylenol every 8 hours for a few days, however the fever would return each time. Last saw PCP on Feb 25 for which he was given Tamiflu which he took, however also reports that he did a Flu/COVID test as well at the time which was negative. Denies any sore throat, rhinorrhea, cough. Denies and nausea, vomiting, diarrhea or abdominal pain. 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 Denies any recent travel. Reports being up to date on COVID/flu vaccination. Of note patient reports self-discontinuing Descovy medication last week for PReP, for which he has been taking for a few years.     In ED:   T 102.2, , 128/77, SpO2 05 RA RR 22  CT Abd/Pelvis: No acute findings  s/p 2300 LR bolus, ceftriaxone  (01 Mar 2025 03:26)        History as above, asked to see this patient who presents with a Fever since approx 10-12 days ago , he has some chills , headaches and bodyaches at times and gets SOB when his fever comes on but no coughing or chest pain, no nausea , vomiting or diarrhea, no urinary symptoms, no penile lesions or discharge. He has not travelled anywhere or eaten anything undercooked, he has no exotic pets . 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 He had dental work ( a filling of his molar ) approx 3 weeks ago. He is Homosexual and has multiple partners and has not been using condoms and even stopped his Prep several weeks ago. He has a negative CT chest and abdomen, blood cultures are negative so far as is his u/a but has elevated LFTs and fevers to 102.2.         PAST MEDICAL & SURGICAL HISTORY:  HIV disease      Unilateral inguinal hernia, without obstruction or gangrene, not specified as recurrent      Known health problems: none          No Known Allergies      Meds:  albuterol    90 MICROgram(s) HFA Inhaler 2 Puff(s) Inhalation every 6 hours PRN  benzonatate 100 milliGRAM(s) Oral three times a day  enoxaparin Injectable 40 milliGRAM(s) SubCutaneous every 24 hours  ibuprofen  Tablet. 600 milliGRAM(s) Oral every 8 hours PRN  melatonin 3 milliGRAM(s) Oral at bedtime PRN  ondansetron Injectable 4 milliGRAM(s) IV Push every 8 hours PRN      SOCIAL HISTORY:  Smoker:  ex smoker  ETOH use:  no  Ilicit Drug use:  no      FAMILY HISTORY:  FHx: kidney cancer (Mother)        VITALS:  Vital Signs Last 24 Hrs  T(C): 38.9 (02 Mar 2025 17:05), Max: 38.9 (02 Mar 2025 17:05)  T(F): 102 (02 Mar 2025 17:05), Max: 102 (02 Mar 2025 17:05)  HR: 79 (02 Mar 2025 13:28) (71 - 94)  BP: 98/60 (02 Mar 2025 13:28) (98/60 - 111/70)  BP(mean): 79 (02 Mar 2025 05:02) (79 - 84)  RR: 16 (02 Mar 2025 13:28) (16 - 17)  SpO2: 95% (02 Mar 2025 13:28) (95% - 96%)    Parameters below as of 02 Mar 2025 13:28  Patient On (Oxygen Delivery Method): room air        LABS/DIAGNOSTIC TESTS:                          14.2   10.16 )-----------( 188      ( 02 Mar 2025 05:50 )             40.7     WBC Count: 10.16 K/uL (03-02 @ 05:50)  WBC Count: 7.50 K/uL (03-01 @ 05:00)  WBC Count: 10.72 K/uL (02-28 @ 21:29)      03-02    140  |  107  |  9   ----------------------------<  101[H]  3.8   |  24  |  0.90    Ca    8.6      02 Mar 2025 05:50  Phos  2.3     03-02  Mg     2.1     03-02    TPro  7.0  /  Alb  3.3[L]  /  TBili  0.7  /  DBili  x   /  AST  302[H]  /  ALT  493[H]  /  AlkPhos  152[H]  03-02      Urine Microscopic-Add On (NC) (02.28.25 @ 21:29)   White Blood Cell - Urine: 4 /HPF  Red Blood Cell - Urine: 0 /HPF  Bacteria: Many /HPF  Squamous Epithelial Cells: Present  Comment - Urine: few amorphous uratesUrinalysis (02.28.25 @ 21:29)   Blood, Urine: Negative  Glucose Qualitative, Urine: Negative mg/dL  pH Urine: 5.0  Color: Dark Yellow  Urine Appearance: Clear  Bilirubin: Small  Ketone - Urine: Trace mg/dL  Specific Gravity: 1.032  Protein, Urine: 30 mg/dL  Urobilinogen: 1.0 mg/dL  Nitrite: Negative  Leukocyte Esterase Concentration: Trace      LIVER FUNCTIONS - ( 02 Mar 2025 05:50 )  Alb: 3.3 g/dL / Pro: 7.0 g/dL / ALK PHOS: 152 U/L / ALT: 493 U/L DA / AST: 302 U/L / GGT: x             PT/INR - ( 28 Feb 2025 21:29 )   PT: 14.3 sec;   INR: 1.22 ratio         PTT - ( 28 Feb 2025 21:29 )  PTT:30.2 sec    LACTATE:    ABG -     CULTURES:   .Blood Blood-Peripheral  02-28 @ 21:29   No growth at 24 hours  --  --          RADIOLOGY:< from: US Abdomen Upper Quadrant Right (03.01.25 @ 08:52) >  ACC: [id number] EXAM:  US ABDOMEN RT UPR QUADRANT   ORDERED BY: BARBARA BACK     PROCEDURE DATE:  03/01/2025          INTERPRETATION:  CLINICAL INFORMATION: Elevated LFTs    COMPARISON: CT 3/1/2025  TECHNIQUE: Sonography of the right upper quadrant.    FINDINGS:  Liver: Within normal limits.  Bile ducts: Normal caliber. Common bile duct measures 3 mm.  Gallbladder: Within normal limits.  Pancreas: Poorly visualized.  Right kidney: 10.9 cm. No hydronephrosis.  Ascites: None.  IVC: Visualized portions are within normal limits.    IMPRESSION:  Negative right upper quadrant abdominal ultrasound.        --- End of Report ---            STEPHEN LAZCANO MD; Attending Radiologist  This document has been electronically signed. Mar  1 2025  9:04AM    < end of copied text >  --------------------------------------------------------------------------------------------------------------------------------------------  ACC: [id number] EXAM:  CT ABDOMEN AND PELVIS IC   ORDERED BY: JOSTIN SELLERS     ACC: [id number] EXAM:  CT CHEST IC   ORDERED BY: JOSTIN SELLERS     PROCEDURE DATE:  03/01/2025          INTERPRETATION:  CLINICAL INFORMATION: Sepsis. Abnormal LFTs    COMPARISON: None.    CONTRAST/COMPLICATIONS:  IV Contrast: Omnipaque 350  90 cc administered   10 cc discarded  Oral Contrast: NONE  .    PROCEDURE:  CT of the Chest, Abdomen and Pelvis was performed.  Sagittal and coronal reformats were performed.    FINDINGS:  CHEST:  LUNGS AND LARGE AIRWAYS: Patent central airways. No focal consolidation.   Mild bibasilar dependent atelectasis. No suspicious nodule.  PLEURA: No pleural effusion.  VESSELS: Within normal limits.  HEART: Heart size is normal. No pericardial effusion.  MEDIASTINUM AND JEAN PIERRE: No lymphadenopathy.  CHEST WALL AND LOWER NECK: Within normal limits.    ABDOMEN AND PELVIS:  LIVER: Within normal limits.  BILE DUCTS: Normal caliber.  GALLBLADDER: Within normal limits.  SPLEEN: Within normal limits.  PANCREAS: Within normal limits.  ADRENALS: Within normal limits.  KIDNEYS/URETERS: Within normal limits.    BLADDER: Within normal limits.  REPRODUCTIVE ORGANS: Prostate is within normal limits.    BOWEL: No bowel obstruction. Appendix is normal.  PERITONEUM/RETROPERITONEUM: Trace free pelvic fluid. No free air or   collection.  VESSELS: Within normal limits.  LYMPH NODES: No lymphadenopathy.  ABDOMINAL WALL: Within normal limits.  BONES: Within normal limits.    IMPRESSION:  Chest CT: No evidence of pneumonia. No acute intrathoracic finding.    CT abdomen/pelvis: Trace free pelvic fluid of indeterminate etiology. No   acute finding in the abdomen or pelvis otherwise.        --- End of Report ---            LISA SHAY; Attending Radiologist  This document has been electronically signed. Mar  1 2025  1:40AM    < end of copied text >  -------------------------------------------------------------------------------------------------------------------------  ACC: [id number] EXAM:  XR CHEST PA LAT 2V   ORDERED BY: JOSTIN SELLERS     PROCEDURE DATE:  02/28/2025          INTERPRETATION:  Chest AP and lateral    CLINICAL HISTORY: Sepsis, short of breath, fever    Comparison:  CT chest of 3/1/2025    Normal heart and mediastinum. Lungs clear. Angles sharp. Bones without   acute abnormality.    IMPRESSION: No acute parenchymal disease    --- End of Report ---            CHRIS MADDOX MD; Attending Radiologist  This document has been electronically signed. Mar  1 2025  9:32AM    < end of copied text >        ROS  [  ] UNABLE TO ELICIT

## 2025-03-02 NOTE — DIETITIAN INITIAL EVALUATION ADULT - PERTINENT LABORATORY DATA
03-02    140  |  107  |  9   ----------------------------<  101[H]  3.8   |  24  |  0.90    Ca    8.6      02 Mar 2025 05:50  Phos  2.3     03-02  Mg     2.1     03-02    TPro  7.0  /  Alb  3.3[L]  /  TBili  0.7  /  DBili  x   /  AST  302[H]  /  ALT  493[H]  /  AlkPhos  152[H]  03-02

## 2025-03-02 NOTE — DIETITIAN INITIAL EVALUATION ADULT - ORAL INTAKE PTA/DIET HISTORY
Patient is alert and oriented. He provided information for the assessment. He denied weight loss and said he is eating well. He eats 3 meals per day and on a regular diet. Usual weight is 155 lbs.  No food preferences to discuss. No GI issues at this time.

## 2025-03-03 ENCOUNTER — RESULT REVIEW (OUTPATIENT)
Age: 45
End: 2025-03-03

## 2025-03-03 DIAGNOSIS — Z75.8 OTHER PROBLEMS RELATED TO MEDICAL FACILITIES AND OTHER HEALTH CARE: ICD-10-CM

## 2025-03-03 DIAGNOSIS — R50.9 FEVER, UNSPECIFIED: ICD-10-CM

## 2025-03-03 DIAGNOSIS — I95.9 HYPOTENSION, UNSPECIFIED: ICD-10-CM

## 2025-03-03 DIAGNOSIS — Z79.899 OTHER LONG TERM (CURRENT) DRUG THERAPY: ICD-10-CM

## 2025-03-03 LAB
ALBUMIN SERPL ELPH-MCNC: 2.9 G/DL — LOW (ref 3.5–5)
ALP SERPL-CCNC: 149 U/L — HIGH (ref 40–120)
ALT FLD-CCNC: 555 U/L DA — HIGH (ref 10–60)
ANION GAP SERPL CALC-SCNC: 5 MMOL/L — SIGNIFICANT CHANGE UP (ref 5–17)
AST SERPL-CCNC: 341 U/L — HIGH (ref 10–40)
BILIRUB SERPL-MCNC: 0.6 MG/DL — SIGNIFICANT CHANGE UP (ref 0.2–1.2)
BUN SERPL-MCNC: 8 MG/DL — SIGNIFICANT CHANGE UP (ref 7–18)
CALCIUM SERPL-MCNC: 8.1 MG/DL — LOW (ref 8.4–10.5)
CHLORIDE SERPL-SCNC: 107 MMOL/L — SIGNIFICANT CHANGE UP (ref 96–108)
CMV IGM FLD-ACNC: 134 AU/ML — HIGH
CMV IGM SERPL QL: POSITIVE
CO2 SERPL-SCNC: 29 MMOL/L — SIGNIFICANT CHANGE UP (ref 22–31)
CREAT SERPL-MCNC: 0.81 MG/DL — SIGNIFICANT CHANGE UP (ref 0.5–1.3)
CRP SERPL-MCNC: 17.4 MG/L — HIGH (ref 0–5)
EGFR: 111 ML/MIN/1.73M2 — SIGNIFICANT CHANGE UP
EGFR: 111 ML/MIN/1.73M2 — SIGNIFICANT CHANGE UP
ERYTHROCYTE [SEDIMENTATION RATE] IN BLOOD: 13 MM/HR — SIGNIFICANT CHANGE UP (ref 0–15)
GLUCOSE SERPL-MCNC: 93 MG/DL — SIGNIFICANT CHANGE UP (ref 70–99)
HAV IGM SER-ACNC: SIGNIFICANT CHANGE UP
HBV CORE IGM SER-ACNC: SIGNIFICANT CHANGE UP
HBV SURFACE AG SER-ACNC: SIGNIFICANT CHANGE UP
HCT VFR BLD CALC: 39.8 % — SIGNIFICANT CHANGE UP (ref 39–50)
HCV AB S/CO SERPL IA: 0.12 S/CO — SIGNIFICANT CHANGE UP (ref 0–0.79)
HCV AB SERPL-IMP: SIGNIFICANT CHANGE UP
HGB BLD-MCNC: 13.7 G/DL — SIGNIFICANT CHANGE UP (ref 13–17)
MAGNESIUM SERPL-MCNC: 2.2 MG/DL — SIGNIFICANT CHANGE UP (ref 1.6–2.6)
MCHC RBC-ENTMCNC: 31.1 PG — SIGNIFICANT CHANGE UP (ref 27–34)
MCHC RBC-ENTMCNC: 34.4 G/DL — SIGNIFICANT CHANGE UP (ref 32–36)
MCV RBC AUTO: 90.2 FL — SIGNIFICANT CHANGE UP (ref 80–100)
NRBC BLD AUTO-RTO: 0 /100 WBCS — SIGNIFICANT CHANGE UP (ref 0–0)
PHOSPHATE SERPL-MCNC: 2.6 MG/DL — SIGNIFICANT CHANGE UP (ref 2.5–4.5)
PLATELET # BLD AUTO: 167 K/UL — SIGNIFICANT CHANGE UP (ref 150–400)
POTASSIUM SERPL-MCNC: 4 MMOL/L — SIGNIFICANT CHANGE UP (ref 3.5–5.3)
POTASSIUM SERPL-SCNC: 4 MMOL/L — SIGNIFICANT CHANGE UP (ref 3.5–5.3)
PROT SERPL-MCNC: 6.2 G/DL — SIGNIFICANT CHANGE UP (ref 6–8.3)
RBC # BLD: 4.41 M/UL — SIGNIFICANT CHANGE UP (ref 4.2–5.8)
RBC # FLD: 11.4 % — SIGNIFICANT CHANGE UP (ref 10.3–14.5)
RHEUMATOID FACT SERPL-ACNC: <10 IU/ML — SIGNIFICANT CHANGE UP (ref 0–13)
SODIUM SERPL-SCNC: 141 MMOL/L — SIGNIFICANT CHANGE UP (ref 135–145)
T GONDII IGM SER QL: <3 AU/ML — SIGNIFICANT CHANGE UP
T GONDII IGM SER QL: NEGATIVE — SIGNIFICANT CHANGE UP
WBC # BLD: 8.3 K/UL — SIGNIFICANT CHANGE UP (ref 3.8–10.5)
WBC # FLD AUTO: 8.3 K/UL — SIGNIFICANT CHANGE UP (ref 3.8–10.5)

## 2025-03-03 PROCEDURE — 99233 SBSQ HOSP IP/OBS HIGH 50: CPT

## 2025-03-03 PROCEDURE — 93306 TTE W/DOPPLER COMPLETE: CPT | Mod: 26

## 2025-03-03 RX ORDER — CEFTRIAXONE 500 MG/1
2000 INJECTION, POWDER, FOR SOLUTION INTRAMUSCULAR; INTRAVENOUS EVERY 24 HOURS
Refills: 0 | Status: DISCONTINUED | OUTPATIENT
Start: 2025-03-03 | End: 2025-03-07

## 2025-03-03 RX ADMIN — Medication 100 MILLIGRAM(S): at 12:28

## 2025-03-03 RX ADMIN — Medication 100 MILLIGRAM(S): at 05:45

## 2025-03-03 RX ADMIN — Medication 600 MILLIGRAM(S): at 14:11

## 2025-03-03 RX ADMIN — CEFTRIAXONE 100 MILLIGRAM(S): 500 INJECTION, POWDER, FOR SOLUTION INTRAMUSCULAR; INTRAVENOUS at 12:28

## 2025-03-03 RX ADMIN — ENOXAPARIN SODIUM 40 MILLIGRAM(S): 100 INJECTION SUBCUTANEOUS at 12:29

## 2025-03-03 RX ADMIN — Medication 600 MILLIGRAM(S): at 14:34

## 2025-03-03 RX ADMIN — Medication 100 MILLIGRAM(S): at 21:48

## 2025-03-03 NOTE — PROGRESS NOTE ADULT - SUBJECTIVE AND OBJECTIVE BOX
Patient is a 44y old  Male who presents with a chief complaint of Sepsis and transaminitis (03 Mar 2025 11:55)      INTERVAL HPI/OVERNIGHT EVENTS: no events noted overnight.    MEDICATIONS  (STANDING):  benzonatate 100 milliGRAM(s) Oral three times a day  cefTRIAXone   IVPB 2000 milliGRAM(s) IV Intermittent every 24 hours  enoxaparin Injectable 40 milliGRAM(s) SubCutaneous every 24 hours    MEDICATIONS  (PRN):  albuterol    90 MICROgram(s) HFA Inhaler 2 Puff(s) Inhalation every 6 hours PRN Bronchospasm  ibuprofen  Tablet. 600 milliGRAM(s) Oral every 8 hours PRN Temp greater or equal to 38C (100.4F), Mild Pain (1 - 3)  melatonin 3 milliGRAM(s) Oral at bedtime PRN Insomnia  ondansetron Injectable 4 milliGRAM(s) IV Push every 8 hours PRN Nausea and/or Vomiting      __________________________________________________  REVIEW OF SYSTEMS:    CONSTITUTIONAL: No fever,   EYES: no acute visual disturbances  NECK: No pain or stiffness  RESPIRATORY: No cough; No shortness of breath  CARDIOVASCULAR: No chest pain, no palpitations  GASTROINTESTINAL: No pain. No nausea or vomiting; No diarrhea   NEUROLOGICAL: No headache or numbness, no tremors  MUSCULOSKELETAL: No joint pain, no muscle pain  GENITOURINARY: no dysuria, no frequency, no hesitancy  PSYCHIATRY: no depression , no anxiety  ALL OTHER  ROS negative        Vital Signs Last 24 Hrs  T(C): 37.6 (03 Mar 2025 15:23), Max: 38.9 (02 Mar 2025 17:05)  T(F): 99.7 (03 Mar 2025 15:23), Max: 102 (02 Mar 2025 17:05)  HR: 96 (03 Mar 2025 14:08) (70 - 96)  BP: 108/55 (03 Mar 2025 14:08) (96/65 - 108/55)  BP(mean): 76 (03 Mar 2025 05:04) (75 - 76)  RR: 18 (03 Mar 2025 14:08) (18 - 18)  SpO2: 95% (03 Mar 2025 14:08) (95% - 97%)    Parameters below as of 03 Mar 2025 14:08  Patient On (Oxygen Delivery Method): room air        ________________________________________________  PHYSICAL EXAM:  See Below     _________________________________________________  LABS:                        13.7   8.30  )-----------( 167      ( 03 Mar 2025 06:20 )             39.8     03-03    141  |  107  |  8   ----------------------------<  93  4.0   |  29  |  0.81    Ca    8.1[L]      03 Mar 2025 06:20  Phos  2.6     03-03  Mg     2.2     03-03    TPro  6.2  /  Alb  2.9[L]  /  TBili  0.6  /  DBili  x   /  AST  341[H]  /  ALT  555[H]  /  AlkPhos  149[H]  03-03      Urinalysis Basic - ( 03 Mar 2025 06:20 )    Color: x / Appearance: x / SG: x / pH: x  Gluc: 93 mg/dL / Ketone: x  / Bili: x / Urobili: x   Blood: x / Protein: x / Nitrite: x   Leuk Esterase: x / RBC: x / WBC x   Sq Epi: x / Non Sq Epi: x / Bacteria: x      CAPILLARY BLOOD GLUCOSE            RADIOLOGY & ADDITIONAL TESTS:    Imaging Personally Reviewed:  YES    Consultant(s) Notes Reviewed:   YES    Care Discussed with Consultants : YES     Plan of care was discussed with patient and /or primary care giver; all questions and concerns were addressed and care was aligned with patient's wishes.

## 2025-03-03 NOTE — PROGRESS NOTE ADULT - SUBJECTIVE AND OBJECTIVE BOX
44y Male is under our care for     MEDS:  cefTRIAXone   IVPB 2000 milliGRAM(s) IV Intermittent every 24 hours    ALLERGIES: Allergies    No Known Allergies    Intolerances    REVIEW OF SYSTEMS:  [  ] Not able to elicit  General:	  Chest:	  GI:	  :  Skin:	  Musculoskeletal:	  Neuro:	    VITALS:  Vital Signs Last 24 Hrs  T(C): 36.8 (03 Mar 2025 05:04), Max: 38.9 (02 Mar 2025 17:05)  T(F): 98.3 (03 Mar 2025 05:04), Max: 102 (02 Mar 2025 17:05)  HR: 70 (03 Mar 2025 05:04) (70 - 83)  BP: 104/62 (03 Mar 2025 05:04) (96/65 - 104/62)  BP(mean): 76 (03 Mar 2025 05:04) (75 - 76)  RR: 18 (03 Mar 2025 05:04) (16 - 18)  SpO2: 97% (03 Mar 2025 05:04) (95% - 97%)    Parameters below as of 03 Mar 2025 05:04  Patient On (Oxygen Delivery Method): room air    PHYSICAL EXAM:  HEENT:  Neck:  Respiratory:  Cardiovascular:  Gastrointestinal:  :  Extremities:  Skin:  Ortho:  Neuro:    LABS/DIAGNOSTIC TESTS:                        13.7   8.30  )-----------( 167      ( 03 Mar 2025 06:20 )             39.8     WBC Count: 8.30 K/uL (03-03 @ 06:20)  WBC Count: 10.16 K/uL (03-02 @ 05:50)  WBC Count: 7.50 K/uL (03-01 @ 05:00)  WBC Count: 10.72 K/uL (02-28 @ 21:29)    03-03    141  |  107  |  8   ----------------------------<  93  4.0   |  29  |  0.81    Ca    8.1[L]      03 Mar 2025 06:20  Phos  2.6     03-03  Mg     2.2     03-03    TPro  6.2  /  Alb  2.9[L]  /  TBili  0.6  /  DBili  x   /  AST  341[H]  /  ALT  555[H]  /  AlkPhos  149[H]  03-03    CULTURES:   .Blood Blood-Peripheral  02-28 @ 21:29   No growth at 48 Hours  --  --    RADIOLOGY:  no new studies 44y Male sitting up in the chair and in no acute distress, he is still having fevers and weakness, no nausea, vomiting or diarrhea.  Blood cultures are testing.     MEDS:  cefTRIAXone   IVPB 2000 milliGRAM(s) IV Intermittent every 24 hours    ALLERGIES: Allergies    No Known Allergies    Intolerances    REVIEW OF SYSTEMS:  [  ] Not able to elicit  General: +fevers +weakness   Chest: no cough no sob  GI: no nvd  : no urinary sxs   Skin: no rashes  Musculoskeletal: no trauma no LBP  Neuro: no ha's no dizziness 	    VITALS:  Vital Signs Last 24 Hrs  T(C): 36.8 (03 Mar 2025 05:04), Max: 38.9 (02 Mar 2025 17:05)  T(F): 98.3 (03 Mar 2025 05:04), Max: 102 (02 Mar 2025 17:05)  HR: 70 (03 Mar 2025 05:04) (70 - 83)  BP: 104/62 (03 Mar 2025 05:04) (96/65 - 104/62)  BP(mean): 76 (03 Mar 2025 05:04) (75 - 76)  RR: 18 (03 Mar 2025 05:04) (16 - 18)  SpO2: 97% (03 Mar 2025 05:04) (95% - 97%)    Parameters below as of 03 Mar 2025 05:04  Patient On (Oxygen Delivery Method): room air    PHYSICAL EXAM:  HEENT: normocephalic, conjunctivae and sclerae clear; moist mucous membranes  Neck: supple no LN's   Respiratory: lungs clear no rales  Cardiovascular: S1 S2 reg no murmurs  Gastrointestinal: +BS with soft, nondistended abdomen; nontender  Extremities: no edema  Skin: no rashes  Ortho: no erythema or joint swelling  Neuro: AAO x 3    LABS/DIAGNOSTIC TESTS:                        13.7   8.30  )-----------( 167      ( 03 Mar 2025 06:20 )             39.8     WBC Count: 8.30 K/uL (03-03 @ 06:20)  WBC Count: 10.16 K/uL (03-02 @ 05:50)  WBC Count: 7.50 K/uL (03-01 @ 05:00)  WBC Count: 10.72 K/uL (02-28 @ 21:29)    03-03    141  |  107  |  8   ----------------------------<  93  4.0   |  29  |  0.81    Ca    8.1[L]      03 Mar 2025 06:20  Phos  2.6     03-03  Mg     2.2     03-03    TPro  6.2  /  Alb  2.9[L]  /  TBili  0.6  /  DBili  x   /  AST  341[H]  /  ALT  555[H]  /  AlkPhos  149[H]  03-03    CULTURES:   .Blood Blood-Peripheral  02-28 @ 21:29   No growth at 48 Hours  --  --    RADIOLOGY:  no new studies

## 2025-03-03 NOTE — PROGRESS NOTE ADULT - SUBJECTIVE AND OBJECTIVE BOX
NP Note discussed with  Primary Attending    INTERVAL HPI/OVERNIGHT EVENTS: pt seen at bedside, MONEWA 7--> 4 this am. elevated lactate, s/p IVF.     MEDICATIONS  (STANDING):  benzonatate 100 milliGRAM(s) Oral three times a day  cefTRIAXone   IVPB 2000 milliGRAM(s) IV Intermittent every 24 hours  enoxaparin Injectable 40 milliGRAM(s) SubCutaneous every 24 hours    MEDICATIONS  (PRN):  albuterol    90 MICROgram(s) HFA Inhaler 2 Puff(s) Inhalation every 6 hours PRN Bronchospasm  ibuprofen  Tablet. 600 milliGRAM(s) Oral every 8 hours PRN Temp greater or equal to 38C (100.4F), Mild Pain (1 - 3)  melatonin 3 milliGRAM(s) Oral at bedtime PRN Insomnia  ondansetron Injectable 4 milliGRAM(s) IV Push every 8 hours PRN Nausea and/or Vomiting      __________________________________________________  REVIEW OF SYSTEMS:    CONSTITUTIONAL: No fever,   EYES: no acute visual disturbances  NECK: No pain or stiffness  RESPIRATORY: No cough; No shortness of breath  CARDIOVASCULAR: No chest pain, no palpitations  GASTROINTESTINAL: No pain. No nausea or vomiting; No diarrhea   NEUROLOGICAL: No headache or numbness, no tremors  MUSCULOSKELETAL: No joint pain, no muscle pain  GENITOURINARY: no dysuria, no frequency, no hesitancy  PSYCHIATRY: no depression , no anxiety  ALL OTHER  ROS negative        Vital Signs Last 24 Hrs  T(C): 37.6 (03 Mar 2025 15:23), Max: 38.9 (02 Mar 2025 17:05)  T(F): 99.7 (03 Mar 2025 15:23), Max: 102 (02 Mar 2025 17:05)  HR: 96 (03 Mar 2025 14:08) (70 - 96)  BP: 108/55 (03 Mar 2025 14:08) (96/65 - 108/55)  BP(mean): 76 (03 Mar 2025 05:04) (75 - 76)  RR: 18 (03 Mar 2025 14:08) (18 - 18)  SpO2: 95% (03 Mar 2025 14:08) (95% - 97%)    Parameters below as of 03 Mar 2025 14:08  Patient On (Oxygen Delivery Method): room air        ________________________________________________  PHYSICAL EXAM:  GENERAL: NAD  HEENT: Normocephalic;  conjunctivae and sclerae clear; moist mucous membranes;   NECK : supple  CHEST/LUNG: Clear to auscultation bilaterally with good air entry   HEART: S1 S2  regular; no murmurs, gallops or rubs  ABDOMEN: Soft, Nontender, Nondistended; Bowel sounds present  EXTREMITIES: no cyanosis; no edema; no calf tenderness  SKIN: warm and dry; no rash  NERVOUS SYSTEM:  Awake and alert; Oriented  to place, person and time ; no new deficits    _________________________________________________  LABS:                        13.7   8.30  )-----------( 167      ( 03 Mar 2025 06:20 )             39.8     03-03    141  |  107  |  8   ----------------------------<  93  4.0   |  29  |  0.81    Ca    8.1[L]      03 Mar 2025 06:20  Phos  2.6     03-03  Mg     2.2     03-03    TPro  6.2  /  Alb  2.9[L]  /  TBili  0.6  /  DBili  x   /  AST  341[H]  /  ALT  555[H]  /  AlkPhos  149[H]  03-03      Urinalysis Basic - ( 03 Mar 2025 06:20 )    Color: x / Appearance: x / SG: x / pH: x  Gluc: 93 mg/dL / Ketone: x  / Bili: x / Urobili: x   Blood: x / Protein: x / Nitrite: x   Leuk Esterase: x / RBC: x / WBC x   Sq Epi: x / Non Sq Epi: x / Bacteria: x      CAPILLARY BLOOD GLUCOSE  RADIOLOGY & ADDITIONAL TESTS:    Imaging  Reviewed:  YES    < from: CT Chest w/ IV Cont (03.01.25 @ 00:41) >    ACC: [id number] EXAM:  CT ABDOMEN AND PELVIS IC   ORDERED BY: JOSTIN SELLERS     ACC: [id number] EXAM:  CT CHEST IC   ORDERED BY: JOSTIN SELLERS     PROCEDURE DATE:  03/01/2025          INTERPRETATION:  CLINICAL INFORMATION: Sepsis. Abnormal LFTs    COMPARISON: None.    CONTRAST/COMPLICATIONS:  IV Contrast: Omnipaque 350  90 cc administered   10 cc discarded  Oral Contrast: NONE  .    PROCEDURE:  CT of the Chest, Abdomen and Pelvis was performed.  Sagittal and coronal reformats were performed.    FINDINGS:  CHEST:  LUNGS AND LARGE AIRWAYS: Patent central airways. No focal consolidation.   Mild bibasilar dependent atelectasis. No suspicious nodule.  PLEURA: No pleural effusion.  VESSELS: Within normal limits.  HEART: Heart size is normal. No pericardial effusion.  MEDIASTINUM AND JEAN PIERRE: No lymphadenopathy.  CHEST WALL AND LOWER NECK: Within normal limits.    ABDOMEN AND PELVIS:  LIVER: Within normal limits.  BILE DUCTS: Normal caliber.  GALLBLADDER: Within normal limits.  SPLEEN: Within normal limits.  PANCREAS: Within normal limits.  ADRENALS: Within normal limits.  KIDNEYS/URETERS: Within normal limits.    BLADDER: Within normal limits.  REPRODUCTIVE ORGANS: Prostate is within normal limits.    BOWEL: No bowel obstruction. Appendix is normal.  PERITONEUM/RETROPERITONEUM: Trace free pelvic fluid. No free air or   collection.  VESSELS: Within normal limits.  LYMPH NODES: No lymphadenopathy.  ABDOMINAL WALL: Within normal limits.  BONES: Within normal limits.    IMPRESSION:  Chest CT: No evidence of pneumonia. No acute intrathoracic finding.    CT abdomen/pelvis: Trace free pelvic fluid of indeterminate etiology. No   acute finding in the abdomen or pelvis otherwise.        --- End of Report ---            LISA SHAY; Attending Radiologist  This document has been electronically signed. Mar  1 2025  1:40AM    < end of copied text >      < from: US Abdomen Upper Quadrant Right (03.01.25 @ 08:52) >  IMPRESSION:  Negative right upper quadrant abdominal ultrasound.    < end of copied text >    < from: Xray Chest 2 Views PA/Lat (02.28.25 @ 22:24) >  IMPRESSION: No acute parenchymal disease    < end of copied text >  Consultant(s) Notes Reviewed:   YES      Plan of care was discussed with patient and /or primary care giver; all questions and concerns were addressed  NP Note discussed with  Primary Attending    INTERVAL HPI/OVERNIGHT EVENTS: pt seen at bedside, no acute change overnight. Febrile this afternoon 101.3F    MEDICATIONS  (STANDING):  benzonatate 100 milliGRAM(s) Oral three times a day  cefTRIAXone   IVPB 2000 milliGRAM(s) IV Intermittent every 24 hours  enoxaparin Injectable 40 milliGRAM(s) SubCutaneous every 24 hours    MEDICATIONS  (PRN):  albuterol    90 MICROgram(s) HFA Inhaler 2 Puff(s) Inhalation every 6 hours PRN Bronchospasm  ibuprofen  Tablet. 600 milliGRAM(s) Oral every 8 hours PRN Temp greater or equal to 38C (100.4F), Mild Pain (1 - 3)  melatonin 3 milliGRAM(s) Oral at bedtime PRN Insomnia  ondansetron Injectable 4 milliGRAM(s) IV Push every 8 hours PRN Nausea and/or Vomiting      __________________________________________________  REVIEW OF SYSTEMS:    CONSTITUTIONAL: No fever,   EYES: no acute visual disturbances  NECK: No pain or stiffness  RESPIRATORY: No cough; No shortness of breath  CARDIOVASCULAR: No chest pain, no palpitations  GASTROINTESTINAL: No pain. No nausea or vomiting; No diarrhea   NEUROLOGICAL: No headache or numbness, no tremors  MUSCULOSKELETAL: No joint pain, no muscle pain  GENITOURINARY: no dysuria, no frequency, no hesitancy  PSYCHIATRY: no depression , no anxiety  ALL OTHER  ROS negative        Vital Signs Last 24 Hrs  T(C): 37.6 (03 Mar 2025 15:23), Max: 38.9 (02 Mar 2025 17:05)  T(F): 99.7 (03 Mar 2025 15:23), Max: 102 (02 Mar 2025 17:05)  HR: 96 (03 Mar 2025 14:08) (70 - 96)  BP: 108/55 (03 Mar 2025 14:08) (96/65 - 108/55)  BP(mean): 76 (03 Mar 2025 05:04) (75 - 76)  RR: 18 (03 Mar 2025 14:08) (18 - 18)  SpO2: 95% (03 Mar 2025 14:08) (95% - 97%)    Parameters below as of 03 Mar 2025 14:08  Patient On (Oxygen Delivery Method): room air        ________________________________________________  PHYSICAL EXAM:  GENERAL: NAD  HEENT: Normocephalic;  conjunctivae and sclerae clear; moist mucous membranes;   NECK : supple  CHEST/LUNG: Clear to auscultation bilaterally with good air entry   HEART: S1 S2  regular; no murmurs, gallops or rubs  ABDOMEN: Soft, Nontender, Nondistended; Bowel sounds present  EXTREMITIES: no cyanosis; no edema; no calf tenderness  SKIN: warm and dry; no rash  NERVOUS SYSTEM:  Awake and alert; Oriented  to place, person and time ; no new deficits    _________________________________________________  LABS:                        13.7   8.30  )-----------( 167      ( 03 Mar 2025 06:20 )             39.8     03-03    141  |  107  |  8   ----------------------------<  93  4.0   |  29  |  0.81    Ca    8.1[L]      03 Mar 2025 06:20  Phos  2.6     03-03  Mg     2.2     03-03    TPro  6.2  /  Alb  2.9[L]  /  TBili  0.6  /  DBili  x   /  AST  341[H]  /  ALT  555[H]  /  AlkPhos  149[H]  03-03      Urinalysis Basic - ( 03 Mar 2025 06:20 )    Color: x / Appearance: x / SG: x / pH: x  Gluc: 93 mg/dL / Ketone: x  / Bili: x / Urobili: x   Blood: x / Protein: x / Nitrite: x   Leuk Esterase: x / RBC: x / WBC x   Sq Epi: x / Non Sq Epi: x / Bacteria: x      CAPILLARY BLOOD GLUCOSE  RADIOLOGY & ADDITIONAL TESTS:    Imaging  Reviewed:  YES    < from: CT Chest w/ IV Cont (03.01.25 @ 00:41) >    ACC: [id number] EXAM:  CT ABDOMEN AND PELVIS IC   ORDERED BY: JOSTIN SELLERS     ACC: [id number] EXAM:  CT CHEST IC   ORDERED BY: JOSTIN SELLERS     PROCEDURE DATE:  03/01/2025          INTERPRETATION:  CLINICAL INFORMATION: Sepsis. Abnormal LFTs    COMPARISON: None.    CONTRAST/COMPLICATIONS:  IV Contrast: Omnipaque 350  90 cc administered   10 cc discarded  Oral Contrast: NONE  .    PROCEDURE:  CT of the Chest, Abdomen and Pelvis was performed.  Sagittal and coronal reformats were performed.    FINDINGS:  CHEST:  LUNGS AND LARGE AIRWAYS: Patent central airways. No focal consolidation.   Mild bibasilar dependent atelectasis. No suspicious nodule.  PLEURA: No pleural effusion.  VESSELS: Within normal limits.  HEART: Heart size is normal. No pericardial effusion.  MEDIASTINUM AND JEAN PIERRE: No lymphadenopathy.  CHEST WALL AND LOWER NECK: Within normal limits.    ABDOMEN AND PELVIS:  LIVER: Within normal limits.  BILE DUCTS: Normal caliber.  GALLBLADDER: Within normal limits.  SPLEEN: Within normal limits.  PANCREAS: Within normal limits.  ADRENALS: Within normal limits.  KIDNEYS/URETERS: Within normal limits.    BLADDER: Within normal limits.  REPRODUCTIVE ORGANS: Prostate is within normal limits.    BOWEL: No bowel obstruction. Appendix is normal.  PERITONEUM/RETROPERITONEUM: Trace free pelvic fluid. No free air or   collection.  VESSELS: Within normal limits.  LYMPH NODES: No lymphadenopathy.  ABDOMINAL WALL: Within normal limits.  BONES: Within normal limits.    IMPRESSION:  Chest CT: No evidence of pneumonia. No acute intrathoracic finding.    CT abdomen/pelvis: Trace free pelvic fluid of indeterminate etiology. No   acute finding in the abdomen or pelvis otherwise.        --- End of Report ---            LISA SHAY; Attending Radiologist  This document has been electronically signed. Mar  1 2025  1:40AM    < end of copied text >      < from: US Abdomen Upper Quadrant Right (03.01.25 @ 08:52) >  IMPRESSION:  Negative right upper quadrant abdominal ultrasound.    < end of copied text >    < from: Xray Chest 2 Views PA/Lat (02.28.25 @ 22:24) >  IMPRESSION: No acute parenchymal disease    < end of copied text >  Consultant(s) Notes Reviewed:   YES      Plan of care was discussed with patient and /or primary care giver; all questions and concerns were addressed

## 2025-03-04 LAB
ACE SERPL-CCNC: 70 U/L — SIGNIFICANT CHANGE UP (ref 14–82)
ALBUMIN SERPL ELPH-MCNC: 3 G/DL — LOW (ref 3.5–5)
ALP SERPL-CCNC: 159 U/L — HIGH (ref 40–120)
ALT FLD-CCNC: 539 U/L DA — HIGH (ref 10–60)
ANION GAP SERPL CALC-SCNC: 5 MMOL/L — SIGNIFICANT CHANGE UP (ref 5–17)
APPEARANCE UR: CLEAR — SIGNIFICANT CHANGE UP
AST SERPL-CCNC: 282 U/L — HIGH (ref 10–40)
BACTERIA # UR AUTO: ABNORMAL /HPF
BILIRUB DIRECT SERPL-MCNC: 0.2 MG/DL — SIGNIFICANT CHANGE UP (ref 0–0.3)
BILIRUB INDIRECT FLD-MCNC: 0.3 MG/DL — SIGNIFICANT CHANGE UP (ref 0.2–1)
BILIRUB SERPL-MCNC: 0.5 MG/DL — SIGNIFICANT CHANGE UP (ref 0.2–1.2)
BILIRUB UR-MCNC: NEGATIVE — SIGNIFICANT CHANGE UP
BUN SERPL-MCNC: 8 MG/DL — SIGNIFICANT CHANGE UP (ref 7–18)
CALCIUM SERPL-MCNC: 8.3 MG/DL — LOW (ref 8.4–10.5)
CHLORIDE SERPL-SCNC: 107 MMOL/L — SIGNIFICANT CHANGE UP (ref 96–108)
CO2 SERPL-SCNC: 26 MMOL/L — SIGNIFICANT CHANGE UP (ref 22–31)
COLOR SPEC: YELLOW — SIGNIFICANT CHANGE UP
CREAT SERPL-MCNC: 0.78 MG/DL — SIGNIFICANT CHANGE UP (ref 0.5–1.3)
CRP SERPL-MCNC: 16.3 MG/L — HIGH (ref 0–5)
DIFF PNL FLD: NEGATIVE — SIGNIFICANT CHANGE UP
EGFR: 112 ML/MIN/1.73M2 — SIGNIFICANT CHANGE UP
EGFR: 112 ML/MIN/1.73M2 — SIGNIFICANT CHANGE UP
ERYTHROCYTE [SEDIMENTATION RATE] IN BLOOD: 11 MM/HR — SIGNIFICANT CHANGE UP (ref 0–15)
FLUAV AG NPH QL: SIGNIFICANT CHANGE UP
FLUBV AG NPH QL: SIGNIFICANT CHANGE UP
GLUCOSE SERPL-MCNC: 100 MG/DL — HIGH (ref 70–99)
GLUCOSE UR QL: NEGATIVE MG/DL — SIGNIFICANT CHANGE UP
HCT VFR BLD CALC: 37.7 % — LOW (ref 39–50)
HGB BLD-MCNC: 13.2 G/DL — SIGNIFICANT CHANGE UP (ref 13–17)
KETONES UR-MCNC: 15 MG/DL
LACTATE SERPL-SCNC: 0.8 MMOL/L — SIGNIFICANT CHANGE UP (ref 0.7–2)
LEUKOCYTE ESTERASE UR-ACNC: NEGATIVE — SIGNIFICANT CHANGE UP
MAGNESIUM SERPL-MCNC: 2 MG/DL — SIGNIFICANT CHANGE UP (ref 1.6–2.6)
MCHC RBC-ENTMCNC: 30.8 PG — SIGNIFICANT CHANGE UP (ref 27–34)
MCHC RBC-ENTMCNC: 35 G/DL — SIGNIFICANT CHANGE UP (ref 32–36)
MCV RBC AUTO: 88.1 FL — SIGNIFICANT CHANGE UP (ref 80–100)
NITRITE UR-MCNC: NEGATIVE — SIGNIFICANT CHANGE UP
NRBC BLD AUTO-RTO: 0 /100 WBCS — SIGNIFICANT CHANGE UP (ref 0–0)
PH UR: 8 — SIGNIFICANT CHANGE UP (ref 5–8)
PHOSPHATE SERPL-MCNC: 2.6 MG/DL — SIGNIFICANT CHANGE UP (ref 2.5–4.5)
PLATELET # BLD AUTO: 180 K/UL — SIGNIFICANT CHANGE UP (ref 150–400)
POTASSIUM SERPL-MCNC: 4.2 MMOL/L — SIGNIFICANT CHANGE UP (ref 3.5–5.3)
POTASSIUM SERPL-SCNC: 4.2 MMOL/L — SIGNIFICANT CHANGE UP (ref 3.5–5.3)
PROT SERPL-MCNC: 6.3 G/DL — SIGNIFICANT CHANGE UP (ref 6–8.3)
PROT UR-MCNC: ABNORMAL MG/DL
RBC # BLD: 4.28 M/UL — SIGNIFICANT CHANGE UP (ref 4.2–5.8)
RBC # FLD: 11.7 % — SIGNIFICANT CHANGE UP (ref 10.3–14.5)
RBC CASTS # UR COMP ASSIST: 0 /HPF — SIGNIFICANT CHANGE UP (ref 0–4)
RSV RNA NPH QL NAA+NON-PROBE: SIGNIFICANT CHANGE UP
SARS-COV-2 RNA SPEC QL NAA+PROBE: SIGNIFICANT CHANGE UP
SODIUM SERPL-SCNC: 138 MMOL/L — SIGNIFICANT CHANGE UP (ref 135–145)
SP GR SPEC: 1.02 — SIGNIFICANT CHANGE UP (ref 1–1.03)
UROBILINOGEN FLD QL: 1 MG/DL — SIGNIFICANT CHANGE UP (ref 0.2–1)
WBC # BLD: 10.34 K/UL — SIGNIFICANT CHANGE UP (ref 3.8–10.5)
WBC # FLD AUTO: 10.34 K/UL — SIGNIFICANT CHANGE UP (ref 3.8–10.5)
WBC UR QL: 1 /HPF — SIGNIFICANT CHANGE UP (ref 0–5)

## 2025-03-04 PROCEDURE — 99233 SBSQ HOSP IP/OBS HIGH 50: CPT

## 2025-03-04 RX ORDER — ACETAMINOPHEN 500 MG/5ML
650 LIQUID (ML) ORAL ONCE
Refills: 0 | Status: COMPLETED | OUTPATIENT
Start: 2025-03-04 | End: 2025-03-04

## 2025-03-04 RX ORDER — ACETAMINOPHEN 500 MG/5ML
1000 LIQUID (ML) ORAL ONCE
Refills: 0 | Status: COMPLETED | OUTPATIENT
Start: 2025-03-04 | End: 2025-03-04

## 2025-03-04 RX ADMIN — Medication 1000 MILLIGRAM(S): at 06:50

## 2025-03-04 RX ADMIN — Medication 100 MILLIGRAM(S): at 13:47

## 2025-03-04 RX ADMIN — Medication 600 MILLIGRAM(S): at 16:04

## 2025-03-04 RX ADMIN — Medication 650 MILLIGRAM(S): at 16:16

## 2025-03-04 RX ADMIN — Medication 400 MILLIGRAM(S): at 06:23

## 2025-03-04 RX ADMIN — CEFTRIAXONE 100 MILLIGRAM(S): 500 INJECTION, POWDER, FOR SOLUTION INTRAMUSCULAR; INTRAVENOUS at 09:46

## 2025-03-04 RX ADMIN — Medication 100 MILLIGRAM(S): at 21:57

## 2025-03-04 RX ADMIN — Medication 100 MILLIGRAM(S): at 06:24

## 2025-03-04 NOTE — PROGRESS NOTE ADULT - SUBJECTIVE AND OBJECTIVE BOX
Patient is a 44y old  Male who presents with a chief complaint of Sepsis and transaminitis (03 Mar 2025 16:23)      INTERVAL HPI/OVERNIGHT EVENTS: had fever 100.2 overnight, f/u sepsis work up, f/u indium scan today due to persistent fever.         REVIEW OF SYSTEMS:  CONSTITUTIONAL: No fever, chills  ENMT:  No difficulty hearing, no change in vision  NECK: No pain or stiffness  RESPIRATORY: No cough, SOB  CARDIOVASCULAR: No chest pain, palpitations  GASTROINTESTINAL: No abdominal pain. No nausea, vomiting, or diarrhea  GENITOURINARY: No dysuria  NEUROLOGICAL: No HA  SKIN: No itching, burning, rashes, or lesions   LYMPH NODES: No enlarged glands  ENDOCRINE: No heat or cold intolerance; No hair loss  MUSCULOSKELETAL: No joint pain or swelling; No muscle, back, or extremity pain  PSYCHIATRIC: No depression, anxiety  HEME/LYMPH: No easy bruising, or bleeding gums    T(C): 37.3 (03-04-25 @ 07:05), Max: 38.5 (03-03-25 @ 14:08)  HR: 86 (03-04-25 @ 07:05) (86 - 96)  BP: 102/69 (03-04-25 @ 07:05) (102/69 - 108/55)  RR: 18 (03-04-25 @ 07:05) (17 - 18)  SpO2: 95% (03-04-25 @ 07:05) (95% - 95%)  Wt(kg): --Vital Signs Last 24 Hrs  T(C): 37.3 (04 Mar 2025 07:05), Max: 38.5 (03 Mar 2025 14:08)  T(F): 99.1 (04 Mar 2025 07:05), Max: 101.3 (03 Mar 2025 14:08)  HR: 86 (04 Mar 2025 07:05) (86 - 96)  BP: 102/69 (04 Mar 2025 07:05) (102/69 - 108/55)  BP(mean): --  RR: 18 (04 Mar 2025 07:05) (17 - 18)  SpO2: 95% (04 Mar 2025 07:05) (95% - 95%)    Parameters below as of 04 Mar 2025 07:05  Patient On (Oxygen Delivery Method): room air    MEDICATIONS  (STANDING):  benzonatate 100 milliGRAM(s) Oral three times a day  cefTRIAXone   IVPB 2000 milliGRAM(s) IV Intermittent every 24 hours  enoxaparin Injectable 40 milliGRAM(s) SubCutaneous every 24 hours    MEDICATIONS  (PRN):  albuterol    90 MICROgram(s) HFA Inhaler 2 Puff(s) Inhalation every 6 hours PRN Bronchospasm  ibuprofen  Tablet. 600 milliGRAM(s) Oral every 8 hours PRN Temp greater or equal to 38C (100.4F), Mild Pain (1 - 3)  melatonin 3 milliGRAM(s) Oral at bedtime PRN Insomnia  ondansetron Injectable 4 milliGRAM(s) IV Push every 8 hours PRN Nausea and/or Vomiting      PHYSICAL EXAM:  GENERAL: NAD  EYES: clear conjunctiva; EOMI  ENMT: Moist mucous membranes  NECK: Supple, No JVD, Normal thyroid  CHEST/LUNG: Clear to auscultation bilaterally; No rales, rhonchi, wheezing, or rubs  HEART: S1, S2, Regular rate and rhythm  ABDOMEN: Soft, Nontender, Nondistended; Bowel sounds present  NEURO: Alert & Oriented X3  EXTREMITIES: No LE edema, no calf tenderness  LYMPH: No lymphadenopathy noted  SKIN: No rashes or lesions    Consultant(s) Notes Reviewed:  [x ] YES  [ ] NO  Care Discussed with Consultants/Other Providers [ x] YES  [ ] NO    LABS:                        13.2   10.34 )-----------( 180      ( 04 Mar 2025 06:35 )             37.7     03-04    138  |  107  |  8   ----------------------------<  100[H]  4.2   |  26  |  0.78    Ca    8.3[L]      04 Mar 2025 06:35  Phos  2.6     03-04  Mg     2.0     03-04    TPro  6.3  /  Alb  3.0[L]  /  TBili  0.5  /  DBili  0.2  /  AST  282[H]  /  ALT  539[H]  /  AlkPhos  159[H]  03-04      CAPILLARY BLOOD GLUCOSE      Urinalysis Basic - ( 04 Mar 2025 06:35 )    Color: x / Appearance: x / SG: x / pH: x  Gluc: 100 mg/dL / Ketone: x  / Bili: x / Urobili: x   Blood: x / Protein: x / Nitrite: x   Leuk Esterase: x / RBC: x / WBC x   Sq Epi: x / Non Sq Epi: x / Bacteria: x        RADIOLOGY & ADDITIONAL TESTS:    Imaging Personally Reviewed:  [ x] YES  [ ] NO  < from: US Abdomen Upper Quadrant Right (03.01.25 @ 08:52) >    ACC: [id number] EXAM:  US ABDOMEN RT UPR QUADRANT   ORDERED BY: BARBARA BACK     PROCEDURE DATE:  03/01/2025          INTERPRETATION:  CLINICAL INFORMATION: Elevated LFTs    COMPARISON: CT 3/1/2025  TECHNIQUE: Sonography of the right upper quadrant.    FINDINGS:  Liver: Within normal limits.  Bile ducts: Normal caliber. Common bile duct measures 3 mm.  Gallbladder: Within normal limits.  Pancreas: Poorly visualized.  Right kidney: 10.9 cm. No hydronephrosis.  Ascites: None.  IVC: Visualized portions are within normal limits.    IMPRESSION:  Negative right upper quadrant abdominal ultrasound.        --- End of Report ---            STEPHEN LAZCANO MD; Attending Radiologist  This document has been electronically signed. Mar  1 2025  9:04AM    < end of copied text >  < from: CT Chest w/ IV Cont (03.01.25 @ 00:41) >    ACC: [id number] EXAM:  CT ABDOMEN AND PELVIS IC   ORDERED BY: JOSTIN SELLERS     ACC: [id number] EXAM:  CT CHEST IC   ORDERED BY: JOSTIN SELLERS     PROCEDURE DATE:  03/01/2025          INTERPRETATION:  CLINICAL INFORMATION: Sepsis. Abnormal LFTs    COMPARISON: None.    CONTRAST/COMPLICATIONS:  IV Contrast: Omnipaque 350  90 cc administered   10 cc discarded  Oral Contrast: NONE  .    PROCEDURE:  CT of the Chest, Abdomen and Pelvis was performed.  Sagittal and coronal reformats were performed.    FINDINGS:  CHEST:  LUNGS AND LARGE AIRWAYS: Patent central airways. No focal consolidation.   Mild bibasilar dependent atelectasis. No suspicious nodule.  PLEURA: No pleural effusion.  VESSELS: Within normal limits.  HEART: Heart size is normal. No pericardial effusion.  MEDIASTINUM AND JEAN PIERRE: No lymphadenopathy.  CHEST WALL AND LOWER NECK: Within normal limits.    ABDOMEN AND PELVIS:  LIVER: Within normal limits.  BILE DUCTS: Normal caliber.  GALLBLADDER: Within normal limits.  SPLEEN: Within normal limits.  PANCREAS: Within normal limits.  ADRENALS: Within normal limits.  KIDNEYS/URETERS: Within normal limits.    BLADDER: Within normal limits.  REPRODUCTIVE ORGANS: Prostate is within normal limits.    BOWEL: No bowel obstruction. Appendix is normal.  PERITONEUM/RETROPERITONEUM: Trace free pelvic fluid. No free air or   collection.  VESSELS: Within normal limits.  LYMPH NODES: No lymphadenopathy.  ABDOMINAL WALL: Within normal limits.  BONES: Within normal limits.    IMPRESSION:  Chest CT: No evidence of pneumonia. No acute intrathoracic finding.    CT abdomen/pelvis: Trace free pelvic fluid of indeterminate etiology. No   acute finding in the abdomen or pelvis otherwise.        --- End of Report ---            LISA SHAY; Attending Radiologist  This document has been electronically signed. Mar  1 2025  1:40AM    < end of copied text >

## 2025-03-05 LAB
ALBUMIN SERPL ELPH-MCNC: 2.8 G/DL — LOW (ref 3.5–5)
ALP SERPL-CCNC: 162 U/L — HIGH (ref 40–120)
ALT FLD-CCNC: 495 U/L DA — HIGH (ref 10–60)
ANA TITR SER: NEGATIVE — SIGNIFICANT CHANGE UP
ANION GAP SERPL CALC-SCNC: 3 MMOL/L — LOW (ref 5–17)
AST SERPL-CCNC: 234 U/L — HIGH (ref 10–40)
BILIRUB SERPL-MCNC: 0.5 MG/DL — SIGNIFICANT CHANGE UP (ref 0.2–1.2)
BUN SERPL-MCNC: 10 MG/DL — SIGNIFICANT CHANGE UP (ref 7–18)
CALCIUM SERPL-MCNC: 8.6 MG/DL — SIGNIFICANT CHANGE UP (ref 8.4–10.5)
CHLORIDE SERPL-SCNC: 108 MMOL/L — SIGNIFICANT CHANGE UP (ref 96–108)
CO2 SERPL-SCNC: 28 MMOL/L — SIGNIFICANT CHANGE UP (ref 22–31)
CREAT SERPL-MCNC: 0.69 MG/DL — SIGNIFICANT CHANGE UP (ref 0.5–1.3)
EBV EA AB SER IA-ACNC: <5 U/ML — SIGNIFICANT CHANGE UP
EBV EA AB TITR SER IF: POSITIVE
EBV EA IGG SER-ACNC: NEGATIVE — SIGNIFICANT CHANGE UP
EBV NA IGG SER IA-ACNC: 407 U/ML — HIGH
EBV PATRN SPEC IB-IMP: SIGNIFICANT CHANGE UP
EBV VCA IGG AVIDITY SER QL IA: POSITIVE
EBV VCA IGM SER IA-ACNC: 155 U/ML — HIGH
EBV VCA IGM SER IA-ACNC: 498 U/ML — HIGH
EBV VCA IGM TITR FLD: POSITIVE
EGFR: 116 ML/MIN/1.73M2 — SIGNIFICANT CHANGE UP
EGFR: 116 ML/MIN/1.73M2 — SIGNIFICANT CHANGE UP
GLUCOSE SERPL-MCNC: 110 MG/DL — HIGH (ref 70–99)
HCT VFR BLD CALC: 38.8 % — LOW (ref 39–50)
HGB BLD-MCNC: 13.2 G/DL — SIGNIFICANT CHANGE UP (ref 13–17)
MCHC RBC-ENTMCNC: 30.6 PG — SIGNIFICANT CHANGE UP (ref 27–34)
MCHC RBC-ENTMCNC: 34 G/DL — SIGNIFICANT CHANGE UP (ref 32–36)
MCV RBC AUTO: 89.8 FL — SIGNIFICANT CHANGE UP (ref 80–100)
NRBC BLD AUTO-RTO: 0 /100 WBCS — SIGNIFICANT CHANGE UP (ref 0–0)
PLATELET # BLD AUTO: 186 K/UL — SIGNIFICANT CHANGE UP (ref 150–400)
POTASSIUM SERPL-MCNC: 4.2 MMOL/L — SIGNIFICANT CHANGE UP (ref 3.5–5.3)
POTASSIUM SERPL-SCNC: 4.2 MMOL/L — SIGNIFICANT CHANGE UP (ref 3.5–5.3)
PROT SERPL-MCNC: 6.3 G/DL — SIGNIFICANT CHANGE UP (ref 6–8.3)
RBC # BLD: 4.32 M/UL — SIGNIFICANT CHANGE UP (ref 4.2–5.8)
RBC # FLD: 11.7 % — SIGNIFICANT CHANGE UP (ref 10.3–14.5)
SODIUM SERPL-SCNC: 139 MMOL/L — SIGNIFICANT CHANGE UP (ref 135–145)
WBC # BLD: 9.43 K/UL — SIGNIFICANT CHANGE UP (ref 3.8–10.5)
WBC # FLD AUTO: 9.43 K/UL — SIGNIFICANT CHANGE UP (ref 3.8–10.5)

## 2025-03-05 PROCEDURE — 99233 SBSQ HOSP IP/OBS HIGH 50: CPT

## 2025-03-05 PROCEDURE — 78802 RP LOCLZJ TUM WHBDY 1 D IMG: CPT | Mod: 26

## 2025-03-05 RX ADMIN — ENOXAPARIN SODIUM 40 MILLIGRAM(S): 100 INJECTION SUBCUTANEOUS at 13:23

## 2025-03-05 RX ADMIN — CEFTRIAXONE 100 MILLIGRAM(S): 500 INJECTION, POWDER, FOR SOLUTION INTRAMUSCULAR; INTRAVENOUS at 11:21

## 2025-03-05 RX ADMIN — Medication 100 MILLIGRAM(S): at 13:23

## 2025-03-05 RX ADMIN — Medication 100 MILLIGRAM(S): at 22:08

## 2025-03-05 RX ADMIN — Medication 100 MILLIGRAM(S): at 05:17

## 2025-03-05 NOTE — PROGRESS NOTE ADULT - SUBJECTIVE AND OBJECTIVE BOX
Patient is a 44y old  Male who presents with a chief complaint of Sepsis and transaminitis (04 Mar 2025 09:48)      INTERVAL HPI/OVERNIGHT EVENTS: No acute events overnight.         REVIEW OF SYSTEMS:  CONSTITUTIONAL: No fever, chills  ENMT:  No difficulty hearing, no change in vision  NECK: No pain or stiffness  RESPIRATORY: No cough, SOB  CARDIOVASCULAR: No chest pain, palpitations  GASTROINTESTINAL: No abdominal pain. No nausea, vomiting, or diarrhea  GENITOURINARY: No dysuria  NEUROLOGICAL: No HA  SKIN: No itching, burning, rashes, or lesions   LYMPH NODES: No enlarged glands  ENDOCRINE: No heat or cold intolerance; No hair loss  MUSCULOSKELETAL: No joint pain or swelling; No muscle, back, or extremity pain  PSYCHIATRIC: No depression, anxiety  HEME/LYMPH: No easy bruising, or bleeding gums    T(C): 37.4 (03-05-25 @ 05:55), Max: 38.2 (03-04-25 @ 15:50)  HR: 88 (03-05-25 @ 05:55) (88 - 90)  BP: 105/71 (03-05-25 @ 05:55) (101/61 - 110/70)  RR: 16 (03-05-25 @ 05:55) (16 - 17)  SpO2: 96% (03-05-25 @ 05:55) (96% - 97%)  Wt(kg): --Vital Signs Last 24 Hrs  T(C): 37.4 (05 Mar 2025 05:55), Max: 38.2 (04 Mar 2025 15:50)  T(F): 99.4 (05 Mar 2025 05:55), Max: 100.8 (04 Mar 2025 15:50)  HR: 88 (05 Mar 2025 05:55) (88 - 90)  BP: 105/71 (05 Mar 2025 05:55) (101/61 - 110/70)  BP(mean): --  RR: 16 (05 Mar 2025 05:55) (16 - 17)  SpO2: 96% (05 Mar 2025 05:55) (96% - 97%)    Parameters below as of 05 Mar 2025 05:55  Patient On (Oxygen Delivery Method): room air      MEDICATIONS  (STANDING):  benzonatate 100 milliGRAM(s) Oral three times a day  cefTRIAXone   IVPB 2000 milliGRAM(s) IV Intermittent every 24 hours  enoxaparin Injectable 40 milliGRAM(s) SubCutaneous every 24 hours    MEDICATIONS  (PRN):  albuterol    90 MICROgram(s) HFA Inhaler 2 Puff(s) Inhalation every 6 hours PRN Bronchospasm  ibuprofen  Tablet. 600 milliGRAM(s) Oral every 8 hours PRN Temp greater or equal to 38C (100.4F), Mild Pain (1 - 3)  melatonin 3 milliGRAM(s) Oral at bedtime PRN Insomnia  ondansetron Injectable 4 milliGRAM(s) IV Push every 8 hours PRN Nausea and/or Vomiting    PHYSICAL EXAM:  GENERAL: NAD  EYES: clear conjunctiva; EOMI  ENMT: Moist mucous membranes  NECK: Supple, No JVD, Normal thyroid  CHEST/LUNG: Clear to auscultation bilaterally; No rales, rhonchi, wheezing, or rubs  HEART: S1, S2, Regular rate and rhythm  ABDOMEN: Soft, Nontender, Nondistended; Bowel sounds present  NEURO: Alert & Oriented X3  EXTREMITIES: No LE edema, no calf tenderness  LYMPH: No lymphadenopathy noted  SKIN: No rashes or lesions    Consultant(s) Notes Reviewed:  [x ] YES  [ ] NO  Care Discussed with Consultants/Other Providers [ x] YES  [ ] NO    LABS:                        13.2   9.43  )-----------( 186      ( 05 Mar 2025 06:30 )             38.8     03-05    139  |  108  |  10  ----------------------------<  110[H]  4.2   |  28  |  0.69    Ca    8.6      05 Mar 2025 06:30  Phos  2.6     03-04  Mg     2.0     03-04    TPro  6.3  /  Alb  2.8[L]  /  TBili  0.5  /  DBili  x   /  AST  234[H]  /  ALT  495[H]  /  AlkPhos  162[H]  03-05      CAPILLARY BLOOD GLUCOSE            Urinalysis Basic - ( 05 Mar 2025 06:30 )    Color: x / Appearance: x / SG: x / pH: x  Gluc: 110 mg/dL / Ketone: x  / Bili: x / Urobili: x   Blood: x / Protein: x / Nitrite: x   Leuk Esterase: x / RBC: x / WBC x   Sq Epi: x / Non Sq Epi: x / Bacteria: x        RADIOLOGY & ADDITIONAL TESTS:    Imaging Personally Reviewed:  [x ] YES  [ ] NO    < from: NM Inflammatory Loc Wholebody WBC, Single Day (03.05.25 @ 10:39) >    ACC: [id number] EXAM:  NM INFLAMM LOC WBC WB SD   ORDERED BY: JEANETTE NATH     PROCEDURE DATE:  03/05/2025          INTERPRETATION:  RADIOPHARMACEUTICAL:  440 microcuries In-111 labeled   autologous leukocytes, I.V.    CLINICAL INFORMATION: 44 year old man with fever of unknown origin;   referred to evaluate for an infectious focus    TECHNIQUE: Radiolabeled autologous leukocytes were injected on   03/04/2025. Approximately 24 hours later on 03/05/2025 whole body images   were obtained were obtained.    COMPARISON: None.    CORRELATION: None.    FINDINGS:    Physiologic radiotracer distribution the liver, spleen and bone marrow    IMPRESSION:    Negative Indium labeled leukocyte scan.    No definite localization of an infectious focus    --- End of Report ---             RYNE LEON MD; Attending Nuclear Medicine  This document has been electronically signed. Mar  5 2025 11:23AM    < end of copied text >

## 2025-03-05 NOTE — PROGRESS NOTE ADULT - SUBJECTIVE AND OBJECTIVE BOX
44y Male    Meds:  cefTRIAXone   IVPB 2000 milliGRAM(s) IV Intermittent every 24 hours    Allergies    No Known Allergies    Intolerances        VITALS:  Vital Signs Last 24 Hrs  T(C): 37.4 (05 Mar 2025 13:42), Max: 37.4 (05 Mar 2025 05:55)  T(F): 99.4 (05 Mar 2025 13:42), Max: 99.4 (05 Mar 2025 05:55)  HR: 90 (05 Mar 2025 13:42) (88 - 90)  BP: 104/62 (05 Mar 2025 13:42) (101/61 - 105/71)  BP(mean): --  RR: 18 (05 Mar 2025 13:42) (16 - 18)  SpO2: 96% (05 Mar 2025 13:42) (96% - 96%)    Parameters below as of 05 Mar 2025 13:42  Patient On (Oxygen Delivery Method): room air        LABS/DIAGNOSTIC TESTS:                          13.2   9.43  )-----------( 186      ( 05 Mar 2025 06:30 )             38.8         03-05    139  |  108  |  10  ----------------------------<  110[H]  4.2   |  28  |  0.69    Ca    8.6      05 Mar 2025 06:30  Phos  2.6     03-04  Mg     2.0     03-04    TPro  6.3  /  Alb  2.8[L]  /  TBili  0.5  /  DBili  x   /  AST  234[H]  /  ALT  495[H]  /  AlkPhos  162[H]  03-05      LIVER FUNCTIONS - ( 05 Mar 2025 06:30 )  Alb: 2.8 g/dL / Pro: 6.3 g/dL / ALK PHOS: 162 U/L / ALT: 495 U/L DA / AST: 234 U/L / GGT: x             CULTURES: Blood Blood-Peripheral  03-03 @ 10:10   No growth at 24 hours  --  --      Blood Blood-Peripheral  03-03 @ 10:05   No growth at 24 hours  --  --      Blood Blood-Peripheral  02-28 @ 21:29   No growth at 4 days  --  --            RADIOLOGY:      ROS:  [  ] UNABLE TO ELICIT 44y Male who is doing much better clinically and feeling well. His fevers have gotten much better overall and only had a low grade temp of 100.8 yesterday and none today. His Indium scan was negative as are his blood cultures and ECHO , his EBV titers just show old infection but he has a a positive CMV IGM and given his symptoms along with the duration and high LFTs ( which are also decreasing) could all indicate acute CMV infection. I have ordered a CMV IGG and a PCR for CMV also which are testing. He has no sore throat, no coughing , no SOB, no nausea, vomiting , diarrhea or abdominal pain and no urinary symptoms.    Meds:  cefTRIAXone   IVPB 2000 milliGRAM(s) IV Intermittent every 24 hours    Allergies    No Known Allergies    Intolerances        VITALS:  Vital Signs Last 24 Hrs  T(C): 37.4 (05 Mar 2025 13:42), Max: 37.4 (05 Mar 2025 05:55)  T(F): 99.4 (05 Mar 2025 13:42), Max: 99.4 (05 Mar 2025 05:55)  HR: 90 (05 Mar 2025 13:42) (88 - 90)  BP: 104/62 (05 Mar 2025 13:42) (101/61 - 105/71)  BP(mean): --  RR: 18 (05 Mar 2025 13:42) (16 - 18)  SpO2: 96% (05 Mar 2025 13:42) (96% - 96%)    Parameters below as of 05 Mar 2025 13:42  Patient On (Oxygen Delivery Method): room air        LABS/DIAGNOSTIC TESTS:                          13.2   9.43  )-----------( 186      ( 05 Mar 2025 06:30 )             38.8         03-05    139  |  108  |  10  ----------------------------<  110[H]  4.2   |  28  |  0.69    Ca    8.6      05 Mar 2025 06:30  Phos  2.6     03-04  Mg     2.0     03-04    TPro  6.3  /  Alb  2.8[L]  /  TBili  0.5  /  DBili  x   /  AST  234[H]  /  ALT  495[H]  /  AlkPhos  162[H]  03-05      LIVER FUNCTIONS - ( 05 Mar 2025 06:30 )  Alb: 2.8 g/dL / Pro: 6.3 g/dL / ALK PHOS: 162 U/L / ALT: 495 U/L DA / AST: 234 U/L / GGT: x             CULTURES: Blood Blood-Peripheral  03-03 @ 10:10   No growth at 24 hours  --  --      Blood Blood-Peripheral  03-03 @ 10:05   No growth at 24 hours  --  --      Blood Blood-Peripheral  02-28 @ 21:29   No growth at 4 days  --  --    CMV IgM Antibody: 134.00 AU/mL (03.03.25 @ 06:20)     Anti-Nuclear Antibody in AM (03.03.25 @ 06:20)   Angiotensin Converting Enzyme, Serum: 70:   Rheumatoid Factor Quant, Serum or Plasma: <10 IU/mL (03.03.25 @ 06:20)     RADIOLOGY:< from: NM Inflammatory Loc Wholebody WBC, Single Day (03.05.25 @ 10:39) >  ACC: [id number] EXAM:  NM INFLAMM LOC WBC WB SD   ORDERED BY: JEANETTE NATH     PROCEDURE DATE:  03/05/2025          INTERPRETATION:  RADIOPHARMACEUTICAL:  440 microcuries In-111 labeled   autologous leukocytes, I.V.    CLINICAL INFORMATION: 44 year old man with fever of unknown origin;   referred to evaluate for an infectious focus    TECHNIQUE: Radiolabeled autologous leukocytes were injected on   03/04/2025. Approximately 24 hours later on 03/05/2025 whole body images   were obtained were obtained.    COMPARISON: None.    CORRELATION: None.    FINDINGS:    Physiologic radiotracer distribution the liver, spleen and bone marrow    IMPRESSION:    Negative Indium labeled leukocyte scan.    No definite localization of an infectious focus    --- End of Report ---             RYNE LEON MD; Attending Nuclear Medicine  This document has been electronically signed. Mar  5 2025 11:23AM    < end of copied text >        ROS:  [  ] UNABLE TO ELICIT

## 2025-03-06 LAB
ALBUMIN SERPL ELPH-MCNC: 2.8 G/DL — LOW (ref 3.5–5)
ALP SERPL-CCNC: 144 U/L — HIGH (ref 40–120)
ALT FLD-CCNC: 404 U/L DA — HIGH (ref 10–60)
ANION GAP SERPL CALC-SCNC: 5 MMOL/L — SIGNIFICANT CHANGE UP (ref 5–17)
AST SERPL-CCNC: 156 U/L — HIGH (ref 10–40)
BILIRUB SERPL-MCNC: 0.4 MG/DL — SIGNIFICANT CHANGE UP (ref 0.2–1.2)
BUN SERPL-MCNC: 10 MG/DL — SIGNIFICANT CHANGE UP (ref 7–18)
CALCIUM SERPL-MCNC: 8.7 MG/DL — SIGNIFICANT CHANGE UP (ref 8.4–10.5)
CHLORIDE SERPL-SCNC: 105 MMOL/L — SIGNIFICANT CHANGE UP (ref 96–108)
CMV IGG FLD QL: 1.3 U/ML — HIGH
CMV IGG SERPL-IMP: POSITIVE
CO2 SERPL-SCNC: 28 MMOL/L — SIGNIFICANT CHANGE UP (ref 22–31)
CREAT SERPL-MCNC: 0.8 MG/DL — SIGNIFICANT CHANGE UP (ref 0.5–1.3)
CULTURE RESULTS: SIGNIFICANT CHANGE UP
CULTURE RESULTS: SIGNIFICANT CHANGE UP
EGFR: 111 ML/MIN/1.73M2 — SIGNIFICANT CHANGE UP
EGFR: 111 ML/MIN/1.73M2 — SIGNIFICANT CHANGE UP
GLUCOSE SERPL-MCNC: 97 MG/DL — SIGNIFICANT CHANGE UP (ref 70–99)
HCT VFR BLD CALC: 37 % — LOW (ref 39–50)
HGB BLD-MCNC: 12.5 G/DL — LOW (ref 13–17)
MCHC RBC-ENTMCNC: 30.5 PG — SIGNIFICANT CHANGE UP (ref 27–34)
MCHC RBC-ENTMCNC: 33.8 G/DL — SIGNIFICANT CHANGE UP (ref 32–36)
MCV RBC AUTO: 90.2 FL — SIGNIFICANT CHANGE UP (ref 80–100)
NRBC BLD AUTO-RTO: 0 /100 WBCS — SIGNIFICANT CHANGE UP (ref 0–0)
PLATELET # BLD AUTO: 183 K/UL — SIGNIFICANT CHANGE UP (ref 150–400)
POTASSIUM SERPL-MCNC: 4.1 MMOL/L — SIGNIFICANT CHANGE UP (ref 3.5–5.3)
POTASSIUM SERPL-SCNC: 4.1 MMOL/L — SIGNIFICANT CHANGE UP (ref 3.5–5.3)
PROT SERPL-MCNC: 6.2 G/DL — SIGNIFICANT CHANGE UP (ref 6–8.3)
RBC # BLD: 4.1 M/UL — LOW (ref 4.2–5.8)
RBC # FLD: 11.9 % — SIGNIFICANT CHANGE UP (ref 10.3–14.5)
SODIUM SERPL-SCNC: 138 MMOL/L — SIGNIFICANT CHANGE UP (ref 135–145)
SPECIMEN SOURCE: SIGNIFICANT CHANGE UP
SPECIMEN SOURCE: SIGNIFICANT CHANGE UP
WBC # BLD: 9.67 K/UL — SIGNIFICANT CHANGE UP (ref 3.8–10.5)
WBC # FLD AUTO: 9.67 K/UL — SIGNIFICANT CHANGE UP (ref 3.8–10.5)

## 2025-03-06 PROCEDURE — 99232 SBSQ HOSP IP/OBS MODERATE 35: CPT

## 2025-03-06 RX ADMIN — CEFTRIAXONE 100 MILLIGRAM(S): 500 INJECTION, POWDER, FOR SOLUTION INTRAMUSCULAR; INTRAVENOUS at 09:25

## 2025-03-06 RX ADMIN — Medication 100 MILLIGRAM(S): at 05:57

## 2025-03-06 NOTE — PROGRESS NOTE ADULT - PROBLEM SELECTOR PROBLEM 5
At high risk for exposure to HIV

## 2025-03-06 NOTE — PROGRESS NOTE ADULT - PROBLEM SELECTOR PLAN 1
-p/w URI symptoms, fevers/chills/myalgias  -VS: T 102.2, , 128/77, SpO2 05 RA RR 22  -Leukocytosis WBC 10  -FLU/COVID negative, full RVP negative  -CT Chest/Abd/Pelvis: Negative for acute pathology   -initial BCX negative. UA negative.  -f/u repeat blood cultures per ID sent again 3/4 due to persisted fever   -rapid HIV, HIV viral load negative   -f/u idium scan today
-p/w URI symptoms, fevers/chills/myalgias  -VS: T 102.2, , 128/77, SpO2 05 RA RR 22  -Leukocytosis WBC 10  -FLU/COVID negative, full RVP negative  -CT Chest/Abd/Pelvis: Negative for acute pathology   -initial BCX negative. UA negative.  -f/u repeat blood cultures per ID sent again 3/4 due to persisted fever   -rapid HIV, HIV viral load negative   -idium scan today negative
-p/w URI symptoms, fevers/chills/myalgias  -VS: T 102.2, , 128/77, SpO2 05 RA RR 22  -Leukocytosis WBC 10  -FLU/COVID negative, full RVP negative  -CT Chest/Abd/Pelvis: Negative for acute pathology   -initial BCX negative. UA negative.  -f/u repeat blood cultures per ID sent again 3/4 due to persisted fever   -rapid HIV, HIV viral load negative   -idium scan today negative  - f/u CMV pcr
-p/w URI symptoms, fevers/chills/myalgias  -VS: T 102.2, , 128/77, SpO2 05 RA RR 22  -Leukocytosis WBC 10  -FLU/COVID negative, full RVP negative  -CT Chest/Abd/Pelvis: Negative for acute pathology   -s/p 2300 LR bolus, ceftriaxone in ED  -ID dr. Chase  -initial BCX negative. UA negative.  -f/u repeat blood cultures per ID.   -rapid HIV, HIV viral load negative   -ibuprofen q8 prn for fever

## 2025-03-06 NOTE — PROGRESS NOTE ADULT - PROBLEM SELECTOR PROBLEM 2
Fever of unknown origin (FUO)

## 2025-03-06 NOTE — PROGRESS NOTE ADULT - PROVIDER SPECIALTY LIST ADULT
Infectious Disease
Internal Medicine
Internal Medicine
Infectious Disease
Internal Medicine

## 2025-03-06 NOTE — PROGRESS NOTE ADULT - PROBLEM SELECTOR PLAN 4
-pt reports taking paracetamol q8 for several days  -Acute hepatitis panel negative  -RUQ US unremarkable  -ibuprofen for fever/pain control  -Trend LFT  -Consider hepatology if LFT keeps trending up. hold off now per attending

## 2025-03-06 NOTE — PROGRESS NOTE ADULT - PROBLEM SELECTOR PLAN 6
DVT ppx-lovenox 40 subq q24
no need, pt is ambulatory
DVT ppx-lovenox 40 subq q24
no need, pt is ambulatory

## 2025-03-06 NOTE — PROGRESS NOTE ADULT - TIME BILLING
Time spent includes direct patient care  (interview and examination of patient), discussion with other providers, support staff and/or patient's family members, review of medical records, ordering diagnostic tests and analyzing results, and documentation excluding time spent doing teaching and procedures.
Time spent includes direct patient care  (interview and examination of patient), discussion with other providers, support staff and/or patient's family members, review of medical records, ordering diagnostic tests and analyzing results, and documentation excluding time spent doing teaching and procedures.
Time spent includes direct patient care (interview and examination of patient), discussion with other providers, support staff and/or patient's family members, review of medical records, ordering diagnostic tests and analyzing results, and documentation.

## 2025-03-06 NOTE — PROGRESS NOTE ADULT - PROBLEM SELECTOR PLAN 7
From home, no needs  -f/u Bcx, indium scan, TTE
d/c planning home once fever free and ID recs  f/u CMV PCR
From home, no needs  -f/u Bcx, indium scan, TTE, All labs reccs by ID.   -c/w Ctx 2g no end date yet  -monitor fever, BP
d/c planning home once fever free and ID recs

## 2025-03-06 NOTE — PROGRESS NOTE ADULT - NS ATTEND AMEND GEN_ALL_CORE FT
I agree with above
44M with no PMH who presented with 2 weeks of flu-like illness with URI symptoms despite outpatient treatment with Tamiflu. Work up here shows no evidence of pneumonia or other causes of sepsis likely viral etiology now admitted for further management.     Patient seen and examined at bedside. No acute events overnight. Currently has no complaints.     Labs and imaging reviewed. AST//404  Exam - NAD, RRR, CTAB, and soft and NT, no LE swelling    #Fever of unknown etiology, suspect viral  #Transaminitis  -ID Dr Chase following, recs appreciated  -c/w ceftriaxone  -TTE with LVEF 55-60%  -indium scan without definite localization of an infectious focus  -trend LFTs, appear stable/downtrending - will need outpatient hepatology follow-up  -CMV IgM positive - possible recent or active infection, IgG positive, f/u CMV PCR   -EBV IgG, IgM, EBVA positive - consistent with late primary infection or reactivation - discussed with ID, suspect etiology of symptoms 2/2 CMV, less likely EBV   -toxoplasma IgM negative, hepatitis panel negative, repeat covid/influenza negative  -follow repeat blood cx sent on 3/3 (holding 3 weeks for HACEK organisms)   -s/p 3 doses of tamiflu no need to continue here neg on repeat swab  -dvt ppx lovenox.
44M with no PMH who presented with 2 weeks of flu-like illness with URI symptoms despite outpatient treatment with Tamiflu. Work up here shows no evidence of pneumonia or other causes of sepsis likely viral etiology now admitted for further management.     Patient seen and examined at bedside. Febrile yesterday to 100.8F, still with no complaints.     Labs and imaging reviewed. AST//495, EBV VCA IgG positive, EBV VCA IgM positive, EBNA IgG positive   NM inflammatory wholebody WBC - No definite localization of an infectious focus  Exam - NAD, RRR, CTAB, and soft and NT, no LE swelling    #Fever of unknown etiology, suspect viral  #Transaminitis  -ID Dr Chase following, recs appreciated  -c/w ceftriaxone  -TTE with LVEF 55-60%  -indium scan without definite localization of an infectious focus  -trend LFTs, appear stable/downtrending - will need outpatient hepatology follow-up  -CMV IgM positive - possible recent or active infection   -EBV IgG, IgM, EBVA positive - consistent with late primary infection or reactivation  -toxoplasma IgM negative, hepatitis panel negative, repeat covid/influenza negative  -follow repeat blood cx sent on 3/3 (holding 3 weeks for HACEK organisms)   -s/p 3 doses of tamiflu no need to continue here neg on repeat swab  -dvt ppx lovenox.
44M with no PMH who presented with 2 weeks of flu-like illness with URI symptoms despite outpatient treatment with Tamiflu. Work up here shows no evidence of pneumonia or other causes of sepsis likely viral etiology now admitted for further management.     Patient seen and examined at bedside. No acute events overnight. Last fever documented yesterday 101.3F at 2PM. Feels well, has no complaints at this time.     Labs and imaging reviewed. ASR//539, CMV IgM antibody 134, positive.   Exam - NAD, RRR, CTAB, and soft and NT, no LE swelling    #Fever of unknown etiology, suspect viral  #Transaminitis  -ID Dr Chase following, recs appreciated  -c/w ceftriaxone  -TTE with LVEF 55-60%  -follow-up indium scan  -trend LFTs, appear stable/downtrending - will need outpatient hepatology follow-up  -CMV IgM positive - possible recent or active infection which may explain fevers, transaminitis   -toxoplasma IgM negative, hepatitis panel negative, repeat covid/influenza negative  -follow repeat blood cx sent on 3/3 (holding 3 weeks for HACEK organisms)   -s/p 3 doses of tamiflu no need to continue here neg on repeat swab  -dvt ppx lovenox

## 2025-03-06 NOTE — PROGRESS NOTE ADULT - REASON FOR ADMISSION
Sepsis and transaminitis

## 2025-03-06 NOTE — PROGRESS NOTE ADULT - PROBLEM SELECTOR PLAN 5
-patient reports he has been taking Descovy for past few years for PrEP (pre-exposure prophylaxis), and denies current history of HIV  -reports self discontinuation of Descovy last week 2/23  -further chart review mentions hx of HIV from 2021  -Rapid HIV, HIV viral load negative  -ID dr. Chase

## 2025-03-06 NOTE — PROGRESS NOTE ADULT - PROBLEM SELECTOR PLAN 2
-Persistent fever unknown origin  -ID dr. Chase consulted, r/o Endocarditis and Viral infection  -f/u ECHO  -Repeat blood cultures x 2 sets ( will hold for 3 weeks for HACEK organisms )  -Acute Hep panel is negative  -send ESR, CRP, GIFTY, RF, ACE level, TOXO IGM, CMV IGM, CMV pcr   -f/u Whole body Indium scan.  -Started Rocephin 2gms iv q24hrs  after blood work taken (3/3).
-Persistent fever unknown origin  -ID dr. Chase consulted, r/o Endocarditis and Viral infection  -f/u ECHO  -Repeat blood cultures x 2 sets ( will hold for 3 weeks for HACEK organisms )  -Acute Hep panel is negative  - Indium scan negative   -Started Rocephin 2gms iv q24hrs  after blood work taken (3/3).
-Persistent fever unknown origin  -ID dr. Chase consulted, r/o Endocarditis and Viral infection  -f/u ECHO  -Repeat blood cultures x 2 sets ( will hold for 3 weeks for HACEK organisms )  -Acute Hep panel is negative  -send ESR, CRP, GIFTY, RF, ACE level, TOXO IGM, CMV IGM, CMV pcr   -f/u Whole body Indium scan.  -Started Rocephin 2gms iv q24hrs  after blood work taken (3/3).
-Persistent fever unknown origin  -ID dr. Chase consulted, r/o Endocarditis and Viral infection  -f/u ECHO  -Repeat blood cultures x 2 sets ( will hold for 3 weeks for HACEK organisms )  -Acute Hep panel is negative  - Indium scan negative   -Started Rocephin 2gms iv q24hrs  after blood work taken (3/3).

## 2025-03-06 NOTE — PROGRESS NOTE ADULT - ASSESSMENT
45yo M w/ no PMHx who p/w 2 weeks of flu-like illness with URI symptoms still persistent despite outpatient treatment with Tamiflu. Work up here shows no evidence of pneumonia or other causes of sepsis likely viral etiology now admitted for further management.     This AM at bedside feeling ok last fever was 102 yesterday evening. Discussed the LFT have kept on rising sending lots of blood work per ID, he is agreeable and now on CTX IV.     Exam:  NAD   RRR  CTABL  Abdo NTND BS+  Ext wwp   Anox3 no FND     Labs and Imaging reviewed.                                  13.7   8.30  )-----------( 167      ( 03 Mar 2025 06:20 )             39.8   141  |  107  |  8   ----------------------------( 93     03-03 @ 06:20  4.0   |  29  |  0.81    Ca: 8.1[L]   Phos: 2.6   M.2    TPro: 6.2 / Alb: 2.9[L] /  TBili 0.6 / DBili x  /  [H] /  [H] /  AlkPhos  149[H]    CT abdomen  grossly unremarkable except trace free pelvic fluid.    Plan:   #Fever of unknown origina  #Transaminitis     -consulted ID Dr. Chase following, c/w IV CTX   -pending Indium scan and TTE formal   -IVF hydration and encourage PO intake   -AST/ALT still elevated going up  ; f/u viral hepatitis panel hold off on hep cx for now will need OP hep for sure   -f/u on labs sent: ESR, CRP, GIFTY, RF, ACE level, TOXO IGM, CMV IGM, CMV pcr, hepatitis panel  -f/u on repeat BCx sent this AM (holding 3 weeks fo HACEK organisms)   -s/p 3 doses of tamiflu no need to continue here neg on repeat swab  -dvt ppx lovenox 
43yo M w/ no PMHx who p/w 2 weeks of flu-like illness with URI symptoms still persistent despite outpatient treatment with Tamiflu. Work up here shows no evidence of pneumonia or other causes of sepsis likely viral etiology now admitted for further management.     This AM at bedside feeling ok but did have 101F overnight and chills and sweats. Discussed the LFT have kept on rising agreeable to see ID doc today and will see if needing Hep Cx. Waiting for Hepatitis panel.     Exam:  NAD   RRR  CTABL  Abdo NTND BS+  Ext wwp   Anox3 no FND     Labs and Imaging reviewed.                                  14.2   10.16 )-----------( 188      ( 02 Mar 2025 05:50 )             40.7   140  |  107  |  9   ----------------------------( 101[H]     03-02 @ 05:50  3.8   |  24  |  0.90    Ca: 8.6   Phos: 2.3[L]   M.1    TPro: 7.0 / Alb: 3.3[L] /  TBili 0.7 / DBili x  /  [H] /  [H] /  AlkPhos  152[H]    CT abdomen  grossly unremarkable except trace free pelvic fluid.    Plan:   -Supportive care, consulted ID will see pt monitor for more fevers   -IVF hydration and encourage PO intake   -AST/ALT still elevated going up; f/u viral hepatitis panel hold off on hep cx for now will need OP hep for sure   -s/p 3 doses of tamiflu no need to continue here neg on repeat swab  -dvt ppx lovenox 
Fevers of unknown Origin  Transaminitis  R/O Endocarditis  R/O Viral infection    Plan -   ·	Continue Rocephin 2gms iv q24hrs   ·	Repeat blood cultures x 2 sets testing  ( will hold for 3 weeks for HACEK organisms )  ·	ECHO  ·	ESR, CRP, GIFTY, RF, ACE level, TOXO IGM, CMV IGM, CMV pcr, hepatitis panel  ·	Whole body Indium scan.
Fevers of unknown Origin - improving  Transaminitis - improving  R/O Endocarditis - no evidence of it at this time  R/O Viral infection - likely acute CMV infection      Plan - Cont  Rocephin 2gms iv q24hrs for now  If he remains afebrile and once we get his CMV pcr results , will plan to DC home on Friday.
45yo M w/ no PMHx who p/w 2 weeks of flu-like illness with URI symptoms still persistent despite outpatient treatment with Tamiflu. Work up here shows no evidence of pneumonia or other causes of sepsis likely viral etiology now admitted for further management.       
43yo M w/ no PMHx who p/w 2 weeks of flu-like illness with URI symptoms still persistent despite outpatient treatment with Tamiflu. Work up here shows no evidence of pneumonia or other causes of sepsis likely viral etiology now admitted for further management.       
44 M PMHx on Descovy for PrEP presents to ED with chief complaint of fevers/chills/myalgias for past 5 days. Flu/Covid Neg.   Patient being admitted to medicine for management of sepsis and transaminitis. Pt with persistent fever of unknown origin. ID consulted.  initial Bcx NGTD. plan to repeat blood culture,started Ceftriaxone. f/u Indium scan, TTE.  
45yo M w/ no PMHx who p/w 2 weeks of flu-like illness with URI symptoms still persistent despite outpatient treatment with Tamiflu. Work up here shows no evidence of pneumonia or other causes of sepsis likely viral etiology now admitted for further management.

## 2025-03-06 NOTE — PROGRESS NOTE ADULT - SUBJECTIVE AND OBJECTIVE BOX
Patient is a 44y old  Male who presents with a chief complaint of Sepsis and transaminitis (05 Mar 2025 16:50)      INTERVAL HPI/OVERNIGHT EVENTS: no acute events overnight       REVIEW OF SYSTEMS:  CONSTITUTIONAL: No fever, chills  ENMT:  No difficulty hearing, no change in vision  NECK: No pain or stiffness  RESPIRATORY: No cough, SOB  CARDIOVASCULAR: No chest pain, palpitations  GASTROINTESTINAL: No abdominal pain. No nausea, vomiting, or diarrhea  GENITOURINARY: No dysuria  NEUROLOGICAL: No HA  SKIN: No itching, burning, rashes, or lesions   LYMPH NODES: No enlarged glands  ENDOCRINE: No heat or cold intolerance; No hair loss  MUSCULOSKELETAL: No joint pain or swelling; No muscle, back, or extremity pain  PSYCHIATRIC: No depression, anxiety  HEME/LYMPH: No easy bruising, or bleeding gums    T(C): 36.8 (03-06-25 @ 05:04), Max: 37.4 (03-05-25 @ 13:42)  HR: 81 (03-06-25 @ 05:04) (81 - 90)  BP: 105/68 (03-06-25 @ 05:04) (104/62 - 111/74)  RR: 16 (03-06-25 @ 05:04) (16 - 18)  SpO2: 95% (03-06-25 @ 05:04) (95% - 96%)  Wt(kg): --Vital Signs Last 24 Hrs  T(C): 36.8 (06 Mar 2025 05:04), Max: 37.4 (05 Mar 2025 13:42)  T(F): 98.2 (06 Mar 2025 05:04), Max: 99.4 (05 Mar 2025 13:42)  HR: 81 (06 Mar 2025 05:04) (81 - 90)  BP: 105/68 (06 Mar 2025 05:04) (104/62 - 111/74)  BP(mean): --  RR: 16 (06 Mar 2025 05:04) (16 - 18)  SpO2: 95% (06 Mar 2025 05:04) (95% - 96%)    Parameters below as of 06 Mar 2025 05:04  Patient On (Oxygen Delivery Method): room air        PHYSICAL EXAM:  GENERAL: NAD  EYES: clear conjunctiva; EOMI  ENMT: Moist mucous membranes  NECK: Supple, No JVD, Normal thyroid  CHEST/LUNG: Clear to auscultation bilaterally; No rales, rhonchi, wheezing, or rubs  HEART: S1, S2, Regular rate and rhythm  ABDOMEN: Soft, Nontender, Nondistended; Bowel sounds present  NEURO: Alert & Oriented X3  EXTREMITIES: No LE edema, no calf tenderness  LYMPH: No lymphadenopathy noted  SKIN: No rashes or lesions    Consultant(s) Notes Reviewed:  [x ] YES  [ ] NO  Care Discussed with Consultants/Other Providers [ x] YES  [ ] NO    LABS:                        12.5   9.67  )-----------( 183      ( 06 Mar 2025 06:25 )             37.0     03-06    138  |  105  |  10  ----------------------------<  97  4.1   |  28  |  0.80    Ca    8.7      06 Mar 2025 06:25    TPro  6.2  /  Alb  2.8[L]  /  TBili  0.4  /  DBili  x   /  AST  156[H]  /  ALT  404[H]  /  AlkPhos  144[H]  03-06      CAPILLARY BLOOD GLUCOSE            Urinalysis Basic - ( 06 Mar 2025 06:25 )    Color: x / Appearance: x / SG: x / pH: x  Gluc: 97 mg/dL / Ketone: x  / Bili: x / Urobili: x   Blood: x / Protein: x / Nitrite: x   Leuk Esterase: x / RBC: x / WBC x   Sq Epi: x / Non Sq Epi: x / Bacteria: x        RADIOLOGY & ADDITIONAL TESTS:    Imaging Personally Reviewed:  [ ] YES  [ ] NO     Patient is a 44y old  Male who presents with a chief complaint of Sepsis and transaminitis (05 Mar 2025 16:50)      INTERVAL HPI/OVERNIGHT EVENTS: no acute events overnight       REVIEW OF SYSTEMS:  CONSTITUTIONAL: No fever, chills  ENMT:  No difficulty hearing, no change in vision  NECK: No pain or stiffness  RESPIRATORY: No cough, SOB  CARDIOVASCULAR: No chest pain, palpitations  GASTROINTESTINAL: No abdominal pain. No nausea, vomiting, or diarrhea  GENITOURINARY: No dysuria  NEUROLOGICAL: No HA  SKIN: No itching, burning, rashes, or lesions   LYMPH NODES: No enlarged glands  ENDOCRINE: No heat or cold intolerance; No hair loss  MUSCULOSKELETAL: No joint pain or swelling; No muscle, back, or extremity pain  PSYCHIATRIC: No depression, anxiety  HEME/LYMPH: No easy bruising, or bleeding gums    T(C): 36.8 (03-06-25 @ 05:04), Max: 37.4 (03-05-25 @ 13:42)  HR: 81 (03-06-25 @ 05:04) (81 - 90)  BP: 105/68 (03-06-25 @ 05:04) (104/62 - 111/74)  RR: 16 (03-06-25 @ 05:04) (16 - 18)  SpO2: 95% (03-06-25 @ 05:04) (95% - 96%)  Wt(kg): --Vital Signs Last 24 Hrs  T(C): 36.8 (06 Mar 2025 05:04), Max: 37.4 (05 Mar 2025 13:42)  T(F): 98.2 (06 Mar 2025 05:04), Max: 99.4 (05 Mar 2025 13:42)  HR: 81 (06 Mar 2025 05:04) (81 - 90)  BP: 105/68 (06 Mar 2025 05:04) (104/62 - 111/74)  BP(mean): --  RR: 16 (06 Mar 2025 05:04) (16 - 18)  SpO2: 95% (06 Mar 2025 05:04) (95% - 96%)    Parameters below as of 06 Mar 2025 05:04  Patient On (Oxygen Delivery Method): room air        PHYSICAL EXAM:  GENERAL: NAD  EYES: clear conjunctiva; EOMI  ENMT: Moist mucous membranes  NECK: Supple, No JVD, Normal thyroid  CHEST/LUNG: Clear to auscultation bilaterally; No rales, rhonchi, wheezing, or rubs  HEART: S1, S2, Regular rate and rhythm  ABDOMEN: Soft, Nontender, Nondistended; Bowel sounds present  NEURO: Alert & Oriented X3  EXTREMITIES: No LE edema, no calf tenderness  LYMPH: No lymphadenopathy noted  SKIN: No rashes or lesions    Consultant(s) Notes Reviewed:  [x ] YES  [ ] NO  Care Discussed with Consultants/Other Providers [ x] YES  [ ] NO    LABS:                        12.5   9.67  )-----------( 183      ( 06 Mar 2025 06:25 )             37.0     03-06    138  |  105  |  10  ----------------------------<  97  4.1   |  28  |  0.80    Ca    8.7      06 Mar 2025 06:25    TPro  6.2  /  Alb  2.8[L]  /  TBili  0.4  /  DBili  x   /  AST  156[H]  /  ALT  404[H]  /  AlkPhos  144[H]  03-06      CAPILLARY BLOOD GLUCOSE            Urinalysis Basic - ( 06 Mar 2025 06:25 )    Color: x / Appearance: x / SG: x / pH: x  Gluc: 97 mg/dL / Ketone: x  / Bili: x / Urobili: x   Blood: x / Protein: x / Nitrite: x   Leuk Esterase: x / RBC: x / WBC x   Sq Epi: x / Non Sq Epi: x / Bacteria: x        RADIOLOGY & ADDITIONAL TESTS:    Imaging Personally Reviewed:  [x ] YES  [ ] NO    < from: NM Inflammatory Loc Wholebody WBC, Single Day (03.05.25 @ 10:39) >    ACC: [id number] EXAM:  NM INFLAMM LOC WBC WB SD   ORDERED BY: JEANETTE NATH     PROCEDURE DATE:  03/05/2025          INTERPRETATION:  RADIOPHARMACEUTICAL:  440 microcuries In-111 labeled   autologous leukocytes, I.V.    CLINICAL INFORMATION: 44 year old man with fever of unknown origin;   referred to evaluate for an infectious focus    TECHNIQUE: Radiolabeled autologous leukocytes were injected on   03/04/2025. Approximately 24 hours later on 03/05/2025 whole body images   were obtained were obtained.    COMPARISON: None.    CORRELATION: None.    FINDINGS:    Physiologic radiotracer distribution the liver, spleen and bone marrow    IMPRESSION:    Negative Indium labeled leukocyte scan.    No definite localization of an infectious focus    --- End of Report ---             RYNE LEON MD; Attending Nuclear Medicine  This document has been electronically signed. Mar  5 2025 11:23AM    < end of copied text >

## 2025-03-06 NOTE — PROGRESS NOTE ADULT - PROBLEM SELECTOR PLAN 3
-likely in the setting of sepsis  -asymptomatic.  -s/p IVF bolus and maintenance fluid.   -Monitor BP/HR, improving.

## 2025-03-07 VITALS
RESPIRATION RATE: 17 BRPM | TEMPERATURE: 99 F | SYSTOLIC BLOOD PRESSURE: 107 MMHG | HEART RATE: 75 BPM | OXYGEN SATURATION: 96 % | DIASTOLIC BLOOD PRESSURE: 64 MMHG

## 2025-03-07 LAB
ANION GAP SERPL CALC-SCNC: 4 MMOL/L — LOW (ref 5–17)
BUN SERPL-MCNC: 10 MG/DL — SIGNIFICANT CHANGE UP (ref 7–18)
CALCIUM SERPL-MCNC: 8.8 MG/DL — SIGNIFICANT CHANGE UP (ref 8.4–10.5)
CHLORIDE SERPL-SCNC: 106 MMOL/L — SIGNIFICANT CHANGE UP (ref 96–108)
CMV DNA CSF QL NAA+PROBE: HIGH IU/ML
CMV DNA SPEC NAA+PROBE-LOG#: 4.25 LOG10IU/ML — HIGH
CO2 SERPL-SCNC: 29 MMOL/L — SIGNIFICANT CHANGE UP (ref 22–31)
CREAT SERPL-MCNC: 0.78 MG/DL — SIGNIFICANT CHANGE UP (ref 0.5–1.3)
EGFR: 112 ML/MIN/1.73M2 — SIGNIFICANT CHANGE UP
EGFR: 112 ML/MIN/1.73M2 — SIGNIFICANT CHANGE UP
GLUCOSE SERPL-MCNC: 92 MG/DL — SIGNIFICANT CHANGE UP (ref 70–99)
HCT VFR BLD CALC: 39.8 % — SIGNIFICANT CHANGE UP (ref 39–50)
HGB BLD-MCNC: 13.5 G/DL — SIGNIFICANT CHANGE UP (ref 13–17)
MCHC RBC-ENTMCNC: 31.3 PG — SIGNIFICANT CHANGE UP (ref 27–34)
MCHC RBC-ENTMCNC: 33.9 G/DL — SIGNIFICANT CHANGE UP (ref 32–36)
MCV RBC AUTO: 92.1 FL — SIGNIFICANT CHANGE UP (ref 80–100)
NRBC BLD AUTO-RTO: 0 /100 WBCS — SIGNIFICANT CHANGE UP (ref 0–0)
PLATELET # BLD AUTO: 203 K/UL — SIGNIFICANT CHANGE UP (ref 150–400)
POTASSIUM SERPL-MCNC: 4 MMOL/L — SIGNIFICANT CHANGE UP (ref 3.5–5.3)
POTASSIUM SERPL-SCNC: 4 MMOL/L — SIGNIFICANT CHANGE UP (ref 3.5–5.3)
RBC # BLD: 4.32 M/UL — SIGNIFICANT CHANGE UP (ref 4.2–5.8)
RBC # FLD: 11.6 % — SIGNIFICANT CHANGE UP (ref 10.3–14.5)
SODIUM SERPL-SCNC: 139 MMOL/L — SIGNIFICANT CHANGE UP (ref 135–145)
WBC # BLD: 9.81 K/UL — SIGNIFICANT CHANGE UP (ref 3.8–10.5)
WBC # FLD AUTO: 9.81 K/UL — SIGNIFICANT CHANGE UP (ref 3.8–10.5)

## 2025-03-07 PROCEDURE — 80053 COMPREHEN METABOLIC PANEL: CPT

## 2025-03-07 PROCEDURE — 86645 CMV ANTIBODY IGM: CPT

## 2025-03-07 PROCEDURE — 93005 ELECTROCARDIOGRAM TRACING: CPT

## 2025-03-07 PROCEDURE — 0241U: CPT

## 2025-03-07 PROCEDURE — 36415 COLL VENOUS BLD VENIPUNCTURE: CPT

## 2025-03-07 PROCEDURE — 85652 RBC SED RATE AUTOMATED: CPT

## 2025-03-07 PROCEDURE — 85610 PROTHROMBIN TIME: CPT

## 2025-03-07 PROCEDURE — 85730 THROMBOPLASTIN TIME PARTIAL: CPT

## 2025-03-07 PROCEDURE — 71046 X-RAY EXAM CHEST 2 VIEWS: CPT

## 2025-03-07 PROCEDURE — 86038 ANTINUCLEAR ANTIBODIES: CPT

## 2025-03-07 PROCEDURE — 99285 EMERGENCY DEPT VISIT HI MDM: CPT | Mod: 25

## 2025-03-07 PROCEDURE — 87536 HIV-1 QUANT&REVRSE TRNSCRPJ: CPT

## 2025-03-07 PROCEDURE — 85027 COMPLETE CBC AUTOMATED: CPT

## 2025-03-07 PROCEDURE — 96372 THER/PROPH/DIAG INJ SC/IM: CPT

## 2025-03-07 PROCEDURE — 85025 COMPLETE CBC W/AUTO DIFF WBC: CPT

## 2025-03-07 PROCEDURE — 86778 TOXOPLASMA ANTIBODY IGM: CPT

## 2025-03-07 PROCEDURE — 74177 CT ABD & PELVIS W/CONTRAST: CPT | Mod: MC

## 2025-03-07 PROCEDURE — 80048 BASIC METABOLIC PNL TOTAL CA: CPT

## 2025-03-07 PROCEDURE — 80076 HEPATIC FUNCTION PANEL: CPT

## 2025-03-07 PROCEDURE — 86663 EPSTEIN-BARR ANTIBODY: CPT

## 2025-03-07 PROCEDURE — 86431 RHEUMATOID FACTOR QUANT: CPT

## 2025-03-07 PROCEDURE — 83605 ASSAY OF LACTIC ACID: CPT

## 2025-03-07 PROCEDURE — 78802 RP LOCLZJ TUM WHBDY 1 D IMG: CPT | Mod: MC

## 2025-03-07 PROCEDURE — 0225U NFCT DS DNA&RNA 21 SARSCOV2: CPT

## 2025-03-07 PROCEDURE — 80074 ACUTE HEPATITIS PANEL: CPT

## 2025-03-07 PROCEDURE — 82164 ANGIOTENSIN I ENZYME TEST: CPT

## 2025-03-07 PROCEDURE — 86703 HIV-1/HIV-2 1 RESULT ANTBDY: CPT

## 2025-03-07 PROCEDURE — 87040 BLOOD CULTURE FOR BACTERIA: CPT

## 2025-03-07 PROCEDURE — 93306 TTE W/DOPPLER COMPLETE: CPT

## 2025-03-07 PROCEDURE — 71260 CT THORAX DX C+: CPT | Mod: MC

## 2025-03-07 PROCEDURE — 76705 ECHO EXAM OF ABDOMEN: CPT

## 2025-03-07 PROCEDURE — 87637 SARSCOV2&INF A&B&RSV AMP PRB: CPT

## 2025-03-07 PROCEDURE — 86664 EPSTEIN-BARR NUCLEAR ANTIGEN: CPT

## 2025-03-07 PROCEDURE — 81001 URINALYSIS AUTO W/SCOPE: CPT

## 2025-03-07 PROCEDURE — 86665 EPSTEIN-BARR CAPSID VCA: CPT

## 2025-03-07 PROCEDURE — 80307 DRUG TEST PRSMV CHEM ANLYZR: CPT

## 2025-03-07 PROCEDURE — 86644 CMV ANTIBODY: CPT

## 2025-03-07 PROCEDURE — 99239 HOSP IP/OBS DSCHRG MGMT >30: CPT

## 2025-03-07 PROCEDURE — 86140 C-REACTIVE PROTEIN: CPT

## 2025-03-07 PROCEDURE — 82550 ASSAY OF CK (CPK): CPT

## 2025-03-07 PROCEDURE — 84100 ASSAY OF PHOSPHORUS: CPT

## 2025-03-07 PROCEDURE — 83735 ASSAY OF MAGNESIUM: CPT

## 2025-03-07 RX ORDER — MELATONIN 5 MG
1 TABLET ORAL
Qty: 0 | Refills: 0 | DISCHARGE
Start: 2025-03-07

## 2025-03-07 RX ORDER — IBUPROFEN 200 MG
1 TABLET ORAL
Qty: 0 | Refills: 0 | DISCHARGE
Start: 2025-03-07

## 2025-03-07 NOTE — DISCHARGE NOTE NURSING/CASE MANAGEMENT/SOCIAL WORK - PATIENT PORTAL LINK FT
You can access the FollowMyHealth Patient Portal offered by [facility] by registering at the following website: [email or web address]. By joining [facility]’s FollowMyHealth portal, you will also be able to view your health information using other applications (apps) compatible with our system.

## 2025-03-07 NOTE — DISCHARGE NOTE NURSING/CASE MANAGEMENT/SOCIAL WORK - FINANCIAL ASSISTANCE
[facility] provides services at a reduced cost to those who are determined to be eligible through [facility]’s financial assistance program. Information regarding [facility]’s financial assistance program can be found by going to [email or web address] or by calling [phone].

## 2025-03-08 LAB
CULTURE RESULTS: SIGNIFICANT CHANGE UP
CULTURE RESULTS: SIGNIFICANT CHANGE UP
SPECIMEN SOURCE: SIGNIFICANT CHANGE UP
SPECIMEN SOURCE: SIGNIFICANT CHANGE UP